# Patient Record
Sex: FEMALE | Race: WHITE | Employment: UNEMPLOYED | URBAN - METROPOLITAN AREA
[De-identification: names, ages, dates, MRNs, and addresses within clinical notes are randomized per-mention and may not be internally consistent; named-entity substitution may affect disease eponyms.]

---

## 2017-08-18 ENCOUNTER — LAB ENCOUNTER (OUTPATIENT)
Dept: LAB | Age: 52
End: 2017-08-18
Attending: FAMILY MEDICINE
Payer: COMMERCIAL

## 2017-08-18 ENCOUNTER — OFFICE VISIT (OUTPATIENT)
Dept: FAMILY MEDICINE CLINIC | Facility: CLINIC | Age: 52
End: 2017-08-18

## 2017-08-18 VITALS
OXYGEN SATURATION: 98 % | BODY MASS INDEX: 31.41 KG/M2 | HEART RATE: 86 BPM | SYSTOLIC BLOOD PRESSURE: 166 MMHG | WEIGHT: 184 LBS | HEIGHT: 64 IN | DIASTOLIC BLOOD PRESSURE: 106 MMHG | RESPIRATION RATE: 16 BRPM

## 2017-08-18 DIAGNOSIS — Z12.39 SCREENING FOR MALIGNANT NEOPLASM OF BREAST: ICD-10-CM

## 2017-08-18 DIAGNOSIS — I10 ESSENTIAL HYPERTENSION: ICD-10-CM

## 2017-08-18 DIAGNOSIS — E03.9 ACQUIRED HYPOTHYROIDISM: ICD-10-CM

## 2017-08-18 DIAGNOSIS — Z13.21 SCREENING FOR MALNUTRITION: ICD-10-CM

## 2017-08-18 DIAGNOSIS — Z23 NEED FOR VACCINATION: ICD-10-CM

## 2017-08-18 DIAGNOSIS — S40.811A ABRASION OF RIGHT UPPER EXTREMITY, INITIAL ENCOUNTER: ICD-10-CM

## 2017-08-18 DIAGNOSIS — G89.29 CHRONIC PAIN OF RIGHT KNEE: ICD-10-CM

## 2017-08-18 DIAGNOSIS — M25.561 CHRONIC PAIN OF RIGHT KNEE: ICD-10-CM

## 2017-08-18 DIAGNOSIS — I10 ESSENTIAL HYPERTENSION: Primary | ICD-10-CM

## 2017-08-18 LAB
25-HYDROXYVITAMIN D (TOTAL): 10.3 NG/ML (ref 30–100)
ALBUMIN SERPL-MCNC: 3.8 G/DL (ref 3.5–4.8)
ALP LIVER SERPL-CCNC: 126 U/L (ref 41–108)
ALT SERPL-CCNC: 40 U/L (ref 14–54)
ANTI-THYROGLOBULIN: <15 U/ML (ref ?–60)
ANTI-THYROPEROXIDASE: <28 U/ML (ref ?–60)
AST SERPL-CCNC: 79 U/L (ref 15–41)
BASOPHILS # BLD AUTO: 0.04 X10(3) UL (ref 0–0.1)
BASOPHILS NFR BLD AUTO: 0.6 %
BILIRUB SERPL-MCNC: 0.6 MG/DL (ref 0.1–2)
BUN BLD-MCNC: 14 MG/DL (ref 8–20)
CALCIUM BLD-MCNC: 8.8 MG/DL (ref 8.3–10.3)
CHLORIDE: 110 MMOL/L (ref 101–111)
CHOLEST SMN-MCNC: 226 MG/DL (ref ?–200)
CO2: 22 MMOL/L (ref 22–32)
CREAT BLD-MCNC: 0.68 MG/DL (ref 0.55–1.02)
EOSINOPHIL # BLD AUTO: 0.1 X10(3) UL (ref 0–0.3)
EOSINOPHIL NFR BLD AUTO: 1.6 %
ERYTHROCYTE [DISTWIDTH] IN BLOOD BY AUTOMATED COUNT: 15.5 % (ref 11.5–16)
FREE T4: 1 NG/DL (ref 0.9–1.8)
GLUCOSE BLD-MCNC: 93 MG/DL (ref 70–99)
HCT VFR BLD AUTO: 36.7 % (ref 34–50)
HDLC SERPL-MCNC: 58 MG/DL (ref 45–?)
HDLC SERPL: 3.9 {RATIO} (ref ?–4.44)
HGB BLD-MCNC: 11.2 G/DL (ref 12–16)
IMMATURE GRANULOCYTE COUNT: 0.02 X10(3) UL (ref 0–1)
IMMATURE GRANULOCYTE RATIO %: 0.3 %
LDLC SERPL CALC-MCNC: 142 MG/DL (ref ?–130)
LDLC SERPL-MCNC: 26 MG/DL (ref 5–40)
LYMPHOCYTES # BLD AUTO: 1.54 X10(3) UL (ref 0.9–4)
LYMPHOCYTES NFR BLD AUTO: 24.9 %
M PROTEIN MFR SERPL ELPH: 8 G/DL (ref 6.1–8.3)
MCH RBC QN AUTO: 28.4 PG (ref 27–33.2)
MCHC RBC AUTO-ENTMCNC: 30.5 G/DL (ref 31–37)
MCV RBC AUTO: 93.1 FL (ref 81–100)
MONOCYTES # BLD AUTO: 0.38 X10(3) UL (ref 0.1–0.6)
MONOCYTES NFR BLD AUTO: 6.1 %
NEUTROPHIL ABS PRELIM: 4.11 X10 (3) UL (ref 1.3–6.7)
NEUTROPHILS # BLD AUTO: 4.11 X10(3) UL (ref 1.3–6.7)
NEUTROPHILS NFR BLD AUTO: 66.5 %
NONHDLC SERPL-MCNC: 168 MG/DL (ref ?–130)
PLATELET # BLD AUTO: 110 10(3)UL (ref 150–450)
POTASSIUM SERPL-SCNC: 3.8 MMOL/L (ref 3.6–5.1)
RBC # BLD AUTO: 3.94 X10(6)UL (ref 3.8–5.1)
RED CELL DISTRIBUTION WIDTH-SD: 52.5 FL (ref 35.1–46.3)
SODIUM SERPL-SCNC: 142 MMOL/L (ref 136–144)
TRIGLYCERIDES: 131 MG/DL (ref ?–150)
TSI SER-ACNC: 3.7 MIU/ML (ref 0.35–5.5)
WBC # BLD AUTO: 6.2 X10(3) UL (ref 4–13)

## 2017-08-18 PROCEDURE — 86376 MICROSOMAL ANTIBODY EACH: CPT

## 2017-08-18 PROCEDURE — 80053 COMPREHEN METABOLIC PANEL: CPT

## 2017-08-18 PROCEDURE — 85025 COMPLETE CBC W/AUTO DIFF WBC: CPT

## 2017-08-18 PROCEDURE — 90715 TDAP VACCINE 7 YRS/> IM: CPT | Performed by: FAMILY MEDICINE

## 2017-08-18 PROCEDURE — 84439 ASSAY OF FREE THYROXINE: CPT

## 2017-08-18 PROCEDURE — 82306 VITAMIN D 25 HYDROXY: CPT

## 2017-08-18 PROCEDURE — 36415 COLL VENOUS BLD VENIPUNCTURE: CPT

## 2017-08-18 PROCEDURE — 80061 LIPID PANEL: CPT

## 2017-08-18 PROCEDURE — 90471 IMMUNIZATION ADMIN: CPT | Performed by: FAMILY MEDICINE

## 2017-08-18 PROCEDURE — 99204 OFFICE O/P NEW MOD 45 MIN: CPT | Performed by: FAMILY MEDICINE

## 2017-08-18 PROCEDURE — 84443 ASSAY THYROID STIM HORMONE: CPT

## 2017-08-18 PROCEDURE — 86800 THYROGLOBULIN ANTIBODY: CPT

## 2017-08-18 NOTE — PROGRESS NOTES
Escobar Nagy is a 46year old female. HPI:     HTN:    Uncontrolled. Severity is probably mild to moderate. Patient has not been on medication for months or years.   She tried a \"water pill\" in the past.  She did not care for the water pill because Malformation [OTHER] Brother    • Thyroid Disorder Sister      Hypothyroid     REVIEW OF SYSTEMS:   GENERAL HEALTH: overall feels well, no fever, no change in weight  SKIN: As in HPI  RESPIRATORY: Denies: SAGRARIO/HUBER/Cough/Wheeze/Orthopnea/PND  CARDIOVASCULAR: HORMONE; Future  - FREE T4 (FREE THYROXINE); Future  - lisinopril 10 MG Oral Tab; Take 1 tablet (10 mg total) by mouth daily. Dispense: 30 tablet; Refill: 1    2.  Acquired hypothyroidism  By history, and patient has not been on medications for months or y

## 2017-08-22 PROBLEM — E03.9 ACQUIRED HYPOTHYROIDISM: Status: ACTIVE | Noted: 2017-08-22

## 2017-08-22 PROBLEM — M25.561 CHRONIC PAIN OF RIGHT KNEE: Status: ACTIVE | Noted: 2017-08-22

## 2017-08-22 PROBLEM — G89.29 CHRONIC PAIN OF RIGHT KNEE: Status: ACTIVE | Noted: 2017-08-22

## 2017-08-22 RX ORDER — LISINOPRIL 10 MG/1
10 TABLET ORAL DAILY
Qty: 30 TABLET | Refills: 1 | Status: SHIPPED | OUTPATIENT
Start: 2017-08-22 | End: 2017-10-21

## 2017-08-24 ENCOUNTER — TELEPHONE (OUTPATIENT)
Dept: FAMILY MEDICINE CLINIC | Facility: CLINIC | Age: 52
End: 2017-08-24

## 2017-08-24 DIAGNOSIS — D64.9 ANEMIA, UNSPECIFIED TYPE: Primary | ICD-10-CM

## 2017-08-24 NOTE — TELEPHONE ENCOUNTER
----- Message from Gerda Ovalles DO sent at 8/24/2017  9:09 AM CDT -----  Please call pt and let her know we will discuss results at her upcoming appt. She is anemic, please order Fe studies to be done before her upcoming appt.

## 2017-08-25 ENCOUNTER — APPOINTMENT (OUTPATIENT)
Dept: LAB | Age: 52
End: 2017-08-25
Attending: FAMILY MEDICINE
Payer: COMMERCIAL

## 2017-08-25 DIAGNOSIS — D64.9 ANEMIA, UNSPECIFIED TYPE: ICD-10-CM

## 2017-08-25 LAB
DEPRECATED HBV CORE AB SER IA-ACNC: 30.9 NG/ML (ref 10–291)
IRON SATURATION: 5 % (ref 13–45)
IRON: 25 UG/DL (ref 28–170)
TOTAL IRON BINDING CAPACITY: 508 UG/DL (ref 298–536)
TRANSFERRIN: 341 MG/DL (ref 200–360)

## 2017-08-25 PROCEDURE — 83540 ASSAY OF IRON: CPT

## 2017-08-25 PROCEDURE — 36415 COLL VENOUS BLD VENIPUNCTURE: CPT

## 2017-08-25 PROCEDURE — 82728 ASSAY OF FERRITIN: CPT

## 2017-08-25 PROCEDURE — 83550 IRON BINDING TEST: CPT

## 2017-09-08 ENCOUNTER — OFFICE VISIT (OUTPATIENT)
Dept: FAMILY MEDICINE CLINIC | Facility: CLINIC | Age: 52
End: 2017-09-08

## 2017-09-08 VITALS
RESPIRATION RATE: 16 BRPM | BODY MASS INDEX: 31.18 KG/M2 | HEART RATE: 80 BPM | DIASTOLIC BLOOD PRESSURE: 78 MMHG | SYSTOLIC BLOOD PRESSURE: 110 MMHG | HEIGHT: 64 IN | WEIGHT: 182.63 LBS

## 2017-09-08 DIAGNOSIS — D50.9 IRON DEFICIENCY ANEMIA, UNSPECIFIED IRON DEFICIENCY ANEMIA TYPE: ICD-10-CM

## 2017-09-08 DIAGNOSIS — E78.00 HYPERCHOLESTEROLEMIA: ICD-10-CM

## 2017-09-08 DIAGNOSIS — Z12.11 SCREENING FOR MALIGNANT NEOPLASM OF COLON: ICD-10-CM

## 2017-09-08 DIAGNOSIS — R79.89 ELEVATED LIVER FUNCTION TESTS: ICD-10-CM

## 2017-09-08 DIAGNOSIS — E55.9 VITAMIN D DEFICIENCY: Primary | ICD-10-CM

## 2017-09-08 PROCEDURE — 99214 OFFICE O/P EST MOD 30 MIN: CPT | Performed by: FAMILY MEDICINE

## 2017-09-08 RX ORDER — ERGOCALCIFEROL 1.25 MG/1
50000 CAPSULE ORAL
Qty: 12 CAPSULE | Refills: 0 | Status: SHIPPED | OUTPATIENT
Start: 2017-09-08 | End: 2017-11-25

## 2017-09-08 NOTE — PROGRESS NOTES
Isistra Person is a 46year old female. HPI:     HTN:    Improved with start of Lisinopril. Severity is mild, pt is on one agent for HTN. Pt has been taking medications as instructed, no medication side effects.    At the store BP was 112-114/75 to l stool/black stool/bloating/constipation/diarrhea  NEURO: denies headaches/dizziness/fainting/weakness/change in vision  PSYCH: denies depression and anxiety      Immunization History  Administered            Date(s) Administered    TDAP                  08 Basophils %      %  0.6   Immature Granulocyte %      %  0.3   Glucose      70 - 99 mg/dL  93   BUN      8 - 20 mg/dL  14   CREATININE      0.55 - 1.02 mg/dL  0.68   GFR      >=60  101   CALCIUM      8.3 - 10.3 mg/dL  8.8   ALKALINE PHOSPHATASE      41 - Iron deficiency anemia, unspecified iron deficiency anemia type  Etiology uncertain, intake versus history of menstruation versus other. Patient encouraged to proceed with screening colonoscopy.   Patient to take over-the-counter iron with vitamin C twice

## 2017-09-08 NOTE — PATIENT INSTRUCTIONS
Take OTC Iron supplement and Vitamin C twice a day with a meal for 2 months. If constipation take MiraLax at bedtime.                     Understanding Your Cholesterol Numbers  The higher your blood cholesterol, the greater your risk for heart attac have fasted. This means you don’t eat for a certain amount of time before the test is done. Along with cholesterol, triglyceride can also lead to blocked arteries. Triglycerides are another type of fat.  Knowing your HDL, LDL, and triglyceride numbers, as w artery. These procedures include percutaneous coronary intervention, angioplasty, stent, and open-heart bypass surgery. · Adults who have diabetes.  Or adults who are at higher risk of having a heart attack or stroke and have an LDL cholesterol level of 70

## 2017-10-21 DIAGNOSIS — I10 ESSENTIAL HYPERTENSION: ICD-10-CM

## 2017-10-23 RX ORDER — LISINOPRIL 10 MG/1
TABLET ORAL
Qty: 90 TABLET | Refills: 0 | Status: SHIPPED | OUTPATIENT
Start: 2017-10-23 | End: 2018-01-05

## 2018-01-05 ENCOUNTER — LAB ENCOUNTER (OUTPATIENT)
Dept: LAB | Age: 53
End: 2018-01-05
Attending: FAMILY MEDICINE
Payer: COMMERCIAL

## 2018-01-05 ENCOUNTER — OFFICE VISIT (OUTPATIENT)
Dept: FAMILY MEDICINE CLINIC | Facility: CLINIC | Age: 53
End: 2018-01-05

## 2018-01-05 VITALS
HEIGHT: 63.78 IN | RESPIRATION RATE: 16 BRPM | DIASTOLIC BLOOD PRESSURE: 74 MMHG | BODY MASS INDEX: 30.25 KG/M2 | HEART RATE: 76 BPM | SYSTOLIC BLOOD PRESSURE: 122 MMHG | WEIGHT: 175 LBS

## 2018-01-05 DIAGNOSIS — Z00.00 LABORATORY EXAM ORDERED AS PART OF ROUTINE GENERAL MEDICAL EXAMINATION: ICD-10-CM

## 2018-01-05 DIAGNOSIS — R79.89 ELEVATED LIVER FUNCTION TESTS: ICD-10-CM

## 2018-01-05 DIAGNOSIS — D50.9 IRON DEFICIENCY ANEMIA, UNSPECIFIED IRON DEFICIENCY ANEMIA TYPE: ICD-10-CM

## 2018-01-05 DIAGNOSIS — Z00.00 ROUTINE GENERAL MEDICAL EXAMINATION AT A HEALTH CARE FACILITY: ICD-10-CM

## 2018-01-05 DIAGNOSIS — Z12.31 ENCOUNTER FOR SCREENING MAMMOGRAM FOR MALIGNANT NEOPLASM OF BREAST: ICD-10-CM

## 2018-01-05 DIAGNOSIS — Z01.419 ENCOUNTER FOR GYNECOLOGICAL EXAMINATION WITHOUT ABNORMAL FINDING: Primary | ICD-10-CM

## 2018-01-05 DIAGNOSIS — Z12.4 SCREENING FOR MALIGNANT NEOPLASM OF CERVIX: ICD-10-CM

## 2018-01-05 DIAGNOSIS — I10 ESSENTIAL HYPERTENSION: ICD-10-CM

## 2018-01-05 PROBLEM — E03.9 ACQUIRED HYPOTHYROIDISM: Status: RESOLVED | Noted: 2017-08-22 | Resolved: 2018-01-05

## 2018-01-05 LAB
25-HYDROXYVITAMIN D (TOTAL): 32.2 NG/ML (ref 30–100)
ALBUMIN SERPL-MCNC: 3.4 G/DL (ref 3.5–4.8)
ALP LIVER SERPL-CCNC: 102 U/L (ref 41–108)
ALT SERPL-CCNC: 32 U/L (ref 14–54)
AST SERPL-CCNC: 33 U/L (ref 15–41)
BILIRUB SERPL-MCNC: 0.7 MG/DL (ref 0.1–2)
BUN BLD-MCNC: 14 MG/DL (ref 8–20)
CALCIUM BLD-MCNC: 9.3 MG/DL (ref 8.3–10.3)
CHLORIDE: 105 MMOL/L (ref 101–111)
CHOLEST SMN-MCNC: 183 MG/DL (ref ?–200)
CO2: 27 MMOL/L (ref 22–32)
CREAT BLD-MCNC: 0.76 MG/DL (ref 0.55–1.02)
DEPRECATED HBV CORE AB SER IA-ACNC: 29.4 NG/ML (ref 10–291)
ERYTHROCYTE [DISTWIDTH] IN BLOOD BY AUTOMATED COUNT: 13.4 % (ref 11.5–16)
FREE T4: 1.1 NG/DL (ref 0.9–1.8)
GLUCOSE BLD-MCNC: 95 MG/DL (ref 70–99)
HCT VFR BLD AUTO: 42.1 % (ref 34–50)
HDLC SERPL-MCNC: 39 MG/DL (ref 45–?)
HDLC SERPL: 4.69 {RATIO} (ref ?–4.44)
HGB BLD-MCNC: 13 G/DL (ref 12–16)
IRON SATURATION: 16 % (ref 13–45)
IRON: 87 UG/DL (ref 28–170)
LDLC SERPL CALC-MCNC: 115 MG/DL (ref ?–130)
M PROTEIN MFR SERPL ELPH: 7.6 G/DL (ref 6.1–8.3)
MCH RBC QN AUTO: 28.7 PG (ref 27–33.2)
MCHC RBC AUTO-ENTMCNC: 30.9 G/DL (ref 31–37)
MCV RBC AUTO: 92.9 FL (ref 81–100)
NONHDLC SERPL-MCNC: 144 MG/DL (ref ?–130)
PLATELET # BLD AUTO: 126 10(3)UL (ref 150–450)
POTASSIUM SERPL-SCNC: 3.9 MMOL/L (ref 3.6–5.1)
RBC # BLD AUTO: 4.53 X10(6)UL (ref 3.8–5.1)
RED CELL DISTRIBUTION WIDTH-SD: 45.8 FL (ref 35.1–46.3)
SODIUM SERPL-SCNC: 139 MMOL/L (ref 136–144)
TOTAL IRON BINDING CAPACITY: 536 UG/DL (ref 298–536)
TRANSFERRIN: 360 MG/DL (ref 200–360)
TRIGL SERPL-MCNC: 146 MG/DL (ref ?–150)
TSI SER-ACNC: 4.44 MIU/ML (ref 0.35–5.5)
VLDLC SERPL CALC-MCNC: 29 MG/DL (ref 5–40)
WBC # BLD AUTO: 5.4 X10(3) UL (ref 4–13)

## 2018-01-05 PROCEDURE — 82728 ASSAY OF FERRITIN: CPT

## 2018-01-05 PROCEDURE — 99396 PREV VISIT EST AGE 40-64: CPT | Performed by: FAMILY MEDICINE

## 2018-01-05 PROCEDURE — 36415 COLL VENOUS BLD VENIPUNCTURE: CPT

## 2018-01-05 PROCEDURE — 82306 VITAMIN D 25 HYDROXY: CPT

## 2018-01-05 PROCEDURE — 85027 COMPLETE CBC AUTOMATED: CPT

## 2018-01-05 PROCEDURE — 80061 LIPID PANEL: CPT

## 2018-01-05 PROCEDURE — 84439 ASSAY OF FREE THYROXINE: CPT

## 2018-01-05 PROCEDURE — 88175 CYTOPATH C/V AUTO FLUID REDO: CPT | Performed by: FAMILY MEDICINE

## 2018-01-05 PROCEDURE — 83540 ASSAY OF IRON: CPT

## 2018-01-05 PROCEDURE — 83550 IRON BINDING TEST: CPT

## 2018-01-05 PROCEDURE — 80053 COMPREHEN METABOLIC PANEL: CPT

## 2018-01-05 PROCEDURE — 84443 ASSAY THYROID STIM HORMONE: CPT

## 2018-01-05 PROCEDURE — 87624 HPV HI-RISK TYP POOLED RSLT: CPT | Performed by: FAMILY MEDICINE

## 2018-01-05 PROCEDURE — 99213 OFFICE O/P EST LOW 20 MIN: CPT | Performed by: FAMILY MEDICINE

## 2018-01-05 RX ORDER — PNV NO.95/FERROUS FUM/FOLIC AC 28MG-0.8MG
1 TABLET ORAL DAILY
COMMUNITY
End: 2020-02-20

## 2018-01-05 RX ORDER — LISINOPRIL 10 MG/1
10 TABLET ORAL DAILY
Qty: 90 TABLET | Refills: 1 | Status: SHIPPED | OUTPATIENT
Start: 2018-01-05 | End: 2019-01-07

## 2018-01-05 RX ORDER — CHOLECALCIFEROL (VITAMIN D3) 1250 MCG
1 CAPSULE ORAL WEEKLY
COMMUNITY
End: 2019-06-15

## 2018-01-05 NOTE — PROGRESS NOTES
HPI:   Jamey Leroy is a 46year old female who presents for her annual wellness visit and Hypertension. Symptoms: denies discharge, itching, burning or dysuria, periods stopped age 52yo.    Last PAP: more than 5 years ago  Abnormal PAP: no  Sexually act Packs/day: 0.50      Years: 39.00        Types: Cigarettes     Start date: 1/18/1978  Smokeless tobacco: Never Used                      Alcohol use: Yes              Comment: 4 drinks a week after work    Occ: just lost her job last week.  Marital Status: HSM or tenderness  : External genitalia normal, introitus and vagina are normal, normal discharge and normal cervix. Bimanual exam normal, no adnexal masses or cervical motion tenderness. Specimen obtained for pap.   MUSCULOSKELETAL: gait normal, no part of routine general medical examination  - TSH+FREE T4; Future  - VITAMIN D, 25-HYDROXY; Future    5. Essential hypertension  Controlled. Continue lisinopril. Pt counseled on healthy diet and regular exercise. Recommend low sodium diet.     - lisinop

## 2018-01-08 LAB — HPV I/H RISK 1 DNA SPEC QL NAA+PROBE: NEGATIVE

## 2018-01-11 ENCOUNTER — TELEPHONE (OUTPATIENT)
Dept: FAMILY MEDICINE CLINIC | Facility: CLINIC | Age: 53
End: 2018-01-11

## 2018-01-11 NOTE — TELEPHONE ENCOUNTER
----- Message from Kannan Farrell DO sent at 1/10/2018  2:58 PM CST -----  Please call patient: Pap and high-risk HPV negative/normal.  However, shift of bacteria consistent with possible bacterial vaginosis, is patient having any symptoms consistent wit

## 2018-01-19 ENCOUNTER — TELEPHONE (OUTPATIENT)
Dept: FAMILY MEDICINE CLINIC | Facility: CLINIC | Age: 53
End: 2018-01-19

## 2018-01-19 NOTE — TELEPHONE ENCOUNTER
----- Message from Chaz Young DO sent at 1/18/2018  7:45 PM CST -----  Please call pt: Vit D now in normal range. Recommend take otc Vit D 2000 units once a day with the biggest meal of the day. Iron improved. She can stop taking the iron supplement.

## 2018-01-19 NOTE — TELEPHONE ENCOUNTER
The patient was informed of her test results and Dr. Kvng Marshall recommendations. The patient verbalized understanding and has no further questions at this time.

## 2018-08-14 ENCOUNTER — TELEPHONE (OUTPATIENT)
Dept: FAMILY MEDICINE CLINIC | Facility: CLINIC | Age: 53
End: 2018-08-14

## 2018-08-14 NOTE — TELEPHONE ENCOUNTER
Annette Santana from [facility] [address] is calling to give an update regarding Carol Viramontes.   She states that the patient completed 19 sessions of an outpatient substance abuse program however she did continue to drink while participating in the program.  They in turn

## 2019-01-07 DIAGNOSIS — I10 ESSENTIAL HYPERTENSION: ICD-10-CM

## 2019-01-07 RX ORDER — LISINOPRIL 10 MG/1
TABLET ORAL
Qty: 30 TABLET | Refills: 0 | Status: SHIPPED | OUTPATIENT
Start: 2019-01-07 | End: 2019-06-15

## 2019-01-07 NOTE — TELEPHONE ENCOUNTER
Lisinopril approved qty 30 NR  Patient needs appt for any further refills  Please call to schedule appt  [phone] (home)

## 2019-02-05 DIAGNOSIS — I10 ESSENTIAL HYPERTENSION: ICD-10-CM

## 2019-02-05 RX ORDER — LISINOPRIL 10 MG/1
TABLET ORAL
Qty: 30 TABLET | Refills: 0 | OUTPATIENT
Start: 2019-02-05

## 2019-02-05 NOTE — TELEPHONE ENCOUNTER
No future appointments.   Unable to refill meds  Patient needs appt  A message was left for patient in January to schedule an appt for further refills

## 2019-04-16 ENCOUNTER — PATIENT OUTREACH (OUTPATIENT)
Dept: FAMILY MEDICINE CLINIC | Facility: CLINIC | Age: 54
End: 2019-04-16

## 2019-06-15 ENCOUNTER — OFFICE VISIT (OUTPATIENT)
Dept: FAMILY MEDICINE CLINIC | Facility: CLINIC | Age: 54
End: 2019-06-15
Payer: COMMERCIAL

## 2019-06-15 VITALS
DIASTOLIC BLOOD PRESSURE: 82 MMHG | SYSTOLIC BLOOD PRESSURE: 128 MMHG | HEIGHT: 64 IN | WEIGHT: 225 LBS | BODY MASS INDEX: 38.41 KG/M2 | HEART RATE: 72 BPM

## 2019-06-15 DIAGNOSIS — E55.9 VITAMIN D DEFICIENCY: ICD-10-CM

## 2019-06-15 DIAGNOSIS — D69.6 THROMBOCYTOPENIA (HCC): ICD-10-CM

## 2019-06-15 DIAGNOSIS — Z12.11 SCREENING FOR MALIGNANT NEOPLASM OF COLON: ICD-10-CM

## 2019-06-15 DIAGNOSIS — F17.210 CIGARETTE SMOKER: ICD-10-CM

## 2019-06-15 DIAGNOSIS — Z87.81 HISTORY OF FRACTURE OF RIGHT HIP: ICD-10-CM

## 2019-06-15 DIAGNOSIS — E78.00 HYPERCHOLESTEROLEMIA: ICD-10-CM

## 2019-06-15 DIAGNOSIS — Z12.31 ENCOUNTER FOR SCREENING MAMMOGRAM FOR MALIGNANT NEOPLASM OF BREAST: ICD-10-CM

## 2019-06-15 DIAGNOSIS — Z78.0 POSTMENOPAUSAL: ICD-10-CM

## 2019-06-15 DIAGNOSIS — D50.9 IRON DEFICIENCY ANEMIA, UNSPECIFIED IRON DEFICIENCY ANEMIA TYPE: ICD-10-CM

## 2019-06-15 DIAGNOSIS — E66.01 CLASS 2 SEVERE OBESITY DUE TO EXCESS CALORIES WITH SERIOUS COMORBIDITY AND BODY MASS INDEX (BMI) OF 38.0 TO 38.9 IN ADULT (HCC): ICD-10-CM

## 2019-06-15 DIAGNOSIS — I10 ESSENTIAL HYPERTENSION: Primary | ICD-10-CM

## 2019-06-15 DIAGNOSIS — M85.851 OSTEOPENIA OF RIGHT HIP: ICD-10-CM

## 2019-06-15 PROCEDURE — 99214 OFFICE O/P EST MOD 30 MIN: CPT | Performed by: FAMILY MEDICINE

## 2019-06-15 RX ORDER — ESCITALOPRAM OXALATE 5 MG/1
5 TABLET ORAL DAILY
COMMUNITY
End: 2021-09-17 | Stop reason: DRUGHIGH

## 2019-06-15 RX ORDER — ESCITALOPRAM OXALATE 10 MG/1
10 TABLET ORAL DAILY
COMMUNITY
Start: 2019-06-06 | End: 2021-09-17 | Stop reason: DRUGHIGH

## 2019-06-15 RX ORDER — HYDROCODONE BITARTRATE AND ACETAMINOPHEN 10; 325 MG/1; MG/1
TABLET ORAL AS NEEDED
Refills: 0 | COMMUNITY
Start: 2019-05-31 | End: 2020-02-20 | Stop reason: ALTCHOICE

## 2019-06-15 RX ORDER — ASPIRIN 325 MG
325 TABLET ORAL DAILY
COMMUNITY
End: 2019-07-24

## 2019-06-15 RX ORDER — LAMOTRIGINE 25 MG/1
50 TABLET ORAL 2 TIMES DAILY
COMMUNITY
Start: 2019-06-06 | End: 2020-07-13 | Stop reason: ALTCHOICE

## 2019-06-15 RX ORDER — THIAMINE MONONITRATE (VIT B1) 100 MG
100 TABLET ORAL DAILY
Refills: 1 | COMMUNITY
Start: 2019-05-17

## 2019-06-15 RX ORDER — OMEPRAZOLE 20 MG/1
20 CAPSULE, DELAYED RELEASE ORAL
COMMUNITY
End: 2020-07-13 | Stop reason: ALTCHOICE

## 2019-06-15 RX ORDER — LISINOPRIL 10 MG/1
10 TABLET ORAL DAILY
Qty: 90 TABLET | Refills: 1 | Status: SHIPPED | OUTPATIENT
Start: 2019-06-15 | End: 2019-11-28

## 2019-06-15 NOTE — PROGRESS NOTES
Mynor Pearce is a 47year old female. HPI:     HTN:    Stable. Severity is mild, patient controlled in the past on lisinopril. .  Off lisinopril for 4-5 months. Home BP monitoring: has not checked lately  Diet: starting to follow low carb diet. hypertension 8/18/2017   • Gallstone pancreatitis 2015    [facility]   • Hip fracture, right ([address] 05/2019   • Iron deficiency anemia 9/8/2017      Past Surgical History:   Procedure Laterality Date   • HIP SURGERY  05/15/2019    Right hip facture with MARQUES and Oriented x3, CN II-XII grossly intact, no focal weakness  PSYCH: affect normal, normal thought content.         DATA:    Component      Latest Ref Rng & Units 1/5/2018 8/25/2017   Glucose      70 - 99 mg/dL 95    BUN      8 - 20 mg/dL 14    CREATININE body mass index (bmi) of 38.0 to 38.9 in adult (hcc)  Hypercholesterolemia  Iron deficiency anemia, unspecified iron deficiency anemia type  History of fracture of right hip  Osteopenia of right hip  Vitamin d deficiency  Cigarette smoker  Postmenopausal regular weightbearing exercises to help prevent osteoporosis. - XR DEXA BONE DENSITOMETRY (CPT=77080); Future  - VITAMIN D, 25-HYDROXY    8. Vitamin D deficiency  Recheck vitamin D level. - VITAMIN D, 25-HYDROXY    9.  Cigarette smoker  Patient advised to

## 2019-06-28 PROBLEM — F17.210 CIGARETTE SMOKER: Status: ACTIVE | Noted: 2019-06-28

## 2019-06-28 PROBLEM — M85.851 OSTEOPENIA OF RIGHT HIP: Status: ACTIVE | Noted: 2019-06-28

## 2019-06-28 PROBLEM — D69.6 THROMBOCYTOPENIA (HCC): Status: ACTIVE | Noted: 2019-06-28

## 2019-06-28 PROBLEM — D69.6 THROMBOCYTOPENIA: Status: ACTIVE | Noted: 2019-06-28

## 2019-06-28 PROBLEM — Z87.81 HISTORY OF FRACTURE OF RIGHT HIP: Status: ACTIVE | Noted: 2019-06-28

## 2019-07-03 ENCOUNTER — TELEPHONE (OUTPATIENT)
Dept: FAMILY MEDICINE CLINIC | Facility: CLINIC | Age: 54
End: 2019-07-03

## 2019-07-03 DIAGNOSIS — Z12.11 ENCOUNTER FOR SCREENING COLONOSCOPY: Primary | ICD-10-CM

## 2019-07-03 NOTE — TELEPHONE ENCOUNTER
Informed patient of Dr. Ning Packer instructions, she verbalized understanding and she will call us to schedule appointment for lab results. She requested referral (Dr. Senait Reddy) to be mailed to her.

## 2019-07-23 ENCOUNTER — OFFICE VISIT (OUTPATIENT)
Dept: SURGERY | Facility: CLINIC | Age: 54
End: 2019-07-23

## 2019-07-23 VITALS
WEIGHT: 214 LBS | DIASTOLIC BLOOD PRESSURE: 72 MMHG | HEIGHT: 64 IN | BODY MASS INDEX: 36.54 KG/M2 | SYSTOLIC BLOOD PRESSURE: 128 MMHG | TEMPERATURE: 98 F

## 2019-07-23 DIAGNOSIS — Z87.81 HISTORY OF FRACTURE OF RIGHT HIP: ICD-10-CM

## 2019-07-23 DIAGNOSIS — F17.210 CIGARETTE SMOKER: ICD-10-CM

## 2019-07-23 DIAGNOSIS — K85.10 GALLSTONE PANCREATITIS: ICD-10-CM

## 2019-07-23 DIAGNOSIS — Z12.11 ENCOUNTER FOR SCREENING COLONOSCOPY: Primary | ICD-10-CM

## 2019-07-23 PROCEDURE — 99203 OFFICE O/P NEW LOW 30 MIN: CPT | Performed by: COLON & RECTAL SURGERY

## 2019-07-23 RX ORDER — POLYETHYLENE GLYCOL 3350, SODIUM CHLORIDE, SODIUM BICARBONATE, POTASSIUM CHLORIDE 420; 11.2; 5.72; 1.48 G/4L; G/4L; G/4L; G/4L
POWDER, FOR SOLUTION ORAL
Qty: 1 BOTTLE | Refills: 0 | Status: SHIPPED | OUTPATIENT
Start: 2019-07-23 | End: 2020-07-13 | Stop reason: ALTCHOICE

## 2019-07-23 NOTE — H&P
New Patient Visit Note       Active Problems      1. Encounter for screening colonoscopy    2. Cigarette smoker    3. History of fracture of right hip    4.  Gallstone pancreatitis        Chief Complaint   Gallstone pancreatitis, encounter for screening col patient has not had a previous colonoscopy. The patient does not have history of prior polyps. The patient does not have a history of prior colon cancer. The patient does not have a history of black/tarry stools.   The patient does not have a was the worst pain of her entire life. She did recover her episode of pancreatitis but has not undergone laparoscopic cholecystectomy. The patient denies any significant alcohol use. She does smoke approximately half a pack per day.     I acted as a sc file      Highest education level: Not on file    Tobacco Use      Smoking status: Current Every Day Smoker        Packs/day: 0.50        Years: 39.00        Pack years: 19.5        Types: Cigarettes        Start date: 1/18/1978      Smokeless tobacco: Nev palpitations and leg swelling. Gastrointestinal: Positive for anal bleeding. Negative for abdominal distention, abdominal pain, blood in stool, constipation, diarrhea, nausea and vomiting.    Genitourinary: Negative for difficulty urinating, dysuria, freq negative in the left inguinal area. Lymphadenopathy:     She has no cervical adenopathy. Right cervical: No superficial cervical and no deep cervical adenopathy present.        Left cervical: No superficial cervical and no deep cervical adenopathy current symptoms attributable to biliary colic, but is at extreme risk for gallstone pancreatitis. She has no current abdominal pain that would be consistent with biliary colic. The patient denies any significant alcohol use.   She does smoke approximat

## 2019-07-24 ENCOUNTER — OFFICE VISIT (OUTPATIENT)
Dept: FAMILY MEDICINE CLINIC | Facility: CLINIC | Age: 54
End: 2019-07-24
Payer: COMMERCIAL

## 2019-07-24 VITALS
SYSTOLIC BLOOD PRESSURE: 126 MMHG | WEIGHT: 217 LBS | HEIGHT: 64 IN | OXYGEN SATURATION: 98 % | BODY MASS INDEX: 37.05 KG/M2 | HEART RATE: 70 BPM | DIASTOLIC BLOOD PRESSURE: 76 MMHG

## 2019-07-24 DIAGNOSIS — D50.9 IRON DEFICIENCY ANEMIA, UNSPECIFIED IRON DEFICIENCY ANEMIA TYPE: Primary | ICD-10-CM

## 2019-07-24 DIAGNOSIS — E78.2 MIXED HYPERLIPIDEMIA: ICD-10-CM

## 2019-07-24 DIAGNOSIS — H57.02 ANISOCORIA: ICD-10-CM

## 2019-07-24 DIAGNOSIS — I10 ESSENTIAL HYPERTENSION: ICD-10-CM

## 2019-07-24 PROBLEM — Z12.11 ENCOUNTER FOR SCREENING COLONOSCOPY: Status: ACTIVE | Noted: 2019-07-24

## 2019-07-24 PROBLEM — K85.10 GALLSTONE PANCREATITIS (HCC): Status: ACTIVE | Noted: 2019-07-24

## 2019-07-24 PROBLEM — K85.10 GALLSTONE PANCREATITIS: Status: ACTIVE | Noted: 2019-07-24

## 2019-07-24 PROCEDURE — 99214 OFFICE O/P EST MOD 30 MIN: CPT | Performed by: FAMILY MEDICINE

## 2019-07-24 RX ORDER — BIOTIN 1 MG
1 TABLET ORAL DAILY
COMMUNITY

## 2019-07-24 NOTE — PATIENT INSTRUCTIONS
Please schedule your mammogram and bone density tests. Kicking the Smoking Habit  If you smoke, quitting is one of the best changes you can make for your heart and your overall health.  Your risk of heart attack goes down within one day of before without success, this time avoid the triggers that may cause the relapse. · Make the most of slip-ups. Try to learn from them, and then get back on track. · Be accountable to your friends and your calendar so that you stay on track.   For family an

## 2019-07-24 NOTE — PROGRESS NOTES
Gary Hopkins is a 47year old female. HPI:     HTN:    Stable. Severity is mild, patient is on one agent, lisinopril, to control her hypertension. Pt has been taking medications as instructed, no medication side effects.        Hyperlipidemia:  Die deficiency anemia 9/8/2017      Past Surgical History:   Procedure Laterality Date   • HIP SURGERY  05/15/2019    Right hip facture with ORIF, Dr. Haleigh Grimes       Social History:    Social History    Tobacco Use      Smoking status: Current Every Day Smoker 4.16     Hemoglobin      11.7 - 15.5 g/dL 11.5 (L) 13.0    Hematocrit      35.0 - 45.0 % 36.3 42.1    MCV      80.0 - 100.0 fL 87.3 92.9    MCH      27.0 - 33.0 pg 27.6 28.7    MCHC      32.0 - 36.0 g/dL 31.7 (L) 30.9 (L)    RDW      11.0 - 15.0 % 13.0 13. Total      22.0 - 32.0 mmol/L  27.0    WBC      4.0 - 13.0 x10(3) uL  5.4    RBC      3.80 - 5.10 x10(6)uL  4.53    RDW-SD      35.1 - 46.3 fL  45.8    Cholesterol, Total      <200 mg/dL 169 183    Triglycerides      <150 mg/dL 177 (H) 146    HDL Cholester otherwise. Orders Placed This Encounter      FERRITIN [457] [Q]      IRON AND TIBC [4973] [Q]      Return in about 3 months (around 10/24/2019) for Annual Wellness Visit and as needed or indicated.

## 2019-07-24 NOTE — PATIENT INSTRUCTIONS
This patient presents at the referral of her primary care physician, Dr. Dori Rivera, for consultations regarding gallstone pancreatitis, and colonoscopy. The patient has never undergone colonoscopy.       The patient denies any abdominal pain or diste procedure were discussed at today's office visit. Additionally, I discussed with the patient that due to her history of gallstone pancreatitis I have a strong recommendation for laparoscopic cholecystectomy.   This is to prevent any further episodes of p

## 2019-09-23 ENCOUNTER — TELEPHONE (OUTPATIENT)
Dept: FAMILY MEDICINE CLINIC | Facility: CLINIC | Age: 54
End: 2019-09-23

## 2019-09-23 NOTE — TELEPHONE ENCOUNTER
Pt said her brother is moving in with her and he has cats and she is allergic. She wants to know what OTC allergy meds are good?

## 2019-10-24 ENCOUNTER — PATIENT OUTREACH (OUTPATIENT)
Dept: SURGERY | Facility: CLINIC | Age: 54
End: 2019-10-24

## 2019-11-28 DIAGNOSIS — I10 ESSENTIAL HYPERTENSION: ICD-10-CM

## 2019-11-29 RX ORDER — LISINOPRIL 10 MG/1
TABLET ORAL
Qty: 90 TABLET | Refills: 0 | Status: SHIPPED | OUTPATIENT
Start: 2019-11-29 | End: 2020-02-20

## 2020-02-04 LAB
% SATURATION: 20 % (CALC) (ref 16–45)
FERRITIN: 54 NG/ML (ref 16–232)
IRON BINDING CAPACITY: 411 MCG/DL (CALC) (ref 250–450)
IRON, TOTAL: 81 MCG/DL (ref 45–160)

## 2020-02-11 ENCOUNTER — HOSPITAL ENCOUNTER (OUTPATIENT)
Dept: BONE DENSITY | Age: 55
Discharge: HOME | End: 2020-02-11
Attending: FAMILY MEDICINE
Payer: COMMERCIAL

## 2020-02-11 ENCOUNTER — HOSPITAL ENCOUNTER (OUTPATIENT)
Dept: MAMMOGRAPHY | Age: 55
Discharge: HOME | End: 2020-02-11
Attending: FAMILY MEDICINE
Payer: COMMERCIAL

## 2020-02-11 DIAGNOSIS — M85.851 OSTEOPENIA OF RIGHT HIP: ICD-10-CM

## 2020-02-11 DIAGNOSIS — Z12.31 ENCOUNTER FOR SCREENING MAMMOGRAM FOR MALIGNANT NEOPLASM OF BREAST: ICD-10-CM

## 2020-02-11 DIAGNOSIS — Z78.0 POSTMENOPAUSAL: ICD-10-CM

## 2020-02-11 PROCEDURE — 77080 DXA BONE DENSITY AXIAL: CPT | Performed by: FAMILY MEDICINE

## 2020-02-11 PROCEDURE — 77067 SCR MAMMO BI INCL CAD: CPT | Performed by: FAMILY MEDICINE

## 2020-02-11 PROCEDURE — 77063 BREAST TOMOSYNTHESIS BI: CPT | Performed by: FAMILY MEDICINE

## 2020-02-20 ENCOUNTER — OFFICE VISIT (OUTPATIENT)
Dept: FAMILY MEDICINE CLINIC | Facility: CLINIC | Age: 55
End: 2020-02-20
Payer: COMMERCIAL

## 2020-02-20 VITALS
WEIGHT: 237 LBS | TEMPERATURE: 98 F | OXYGEN SATURATION: 98 % | HEIGHT: 64 IN | BODY MASS INDEX: 40.46 KG/M2 | HEART RATE: 72 BPM | DIASTOLIC BLOOD PRESSURE: 90 MMHG | SYSTOLIC BLOOD PRESSURE: 140 MMHG

## 2020-02-20 DIAGNOSIS — H93.13 TINNITUS OF BOTH EARS: ICD-10-CM

## 2020-02-20 DIAGNOSIS — R63.5 WEIGHT GAIN: ICD-10-CM

## 2020-02-20 DIAGNOSIS — R21 RASH AND NONSPECIFIC SKIN ERUPTION: ICD-10-CM

## 2020-02-20 DIAGNOSIS — H91.93 HEARING DECREASED, BILATERAL: ICD-10-CM

## 2020-02-20 DIAGNOSIS — E66.01 MORBID OBESITY WITH BMI OF 40.0-44.9, ADULT (HCC): ICD-10-CM

## 2020-02-20 DIAGNOSIS — I10 ESSENTIAL HYPERTENSION: Primary | ICD-10-CM

## 2020-02-20 PROCEDURE — 99214 OFFICE O/P EST MOD 30 MIN: CPT | Performed by: FAMILY MEDICINE

## 2020-02-20 RX ORDER — LAMOTRIGINE 100 MG/1
150 TABLET ORAL 2 TIMES DAILY
COMMUNITY
Start: 2020-02-16 | End: 2020-07-13 | Stop reason: ALTCHOICE

## 2020-02-20 RX ORDER — LISINOPRIL 10 MG/1
10 TABLET ORAL NIGHTLY
Qty: 90 TABLET | Refills: 0 | Status: SHIPPED | OUTPATIENT
Start: 2020-02-20 | End: 2020-06-01

## 2020-02-20 RX ORDER — CELECOXIB 200 MG/1
200 CAPSULE ORAL 2 TIMES DAILY
COMMUNITY
End: 2020-07-13

## 2020-02-20 NOTE — PROGRESS NOTES
Sonal Correa is a 47year old female. HPI:     HTN:    Uncontrolled. Severity is mild, pt is on one agent for her HTN. Pt has been taking medications as instructed, no medication side effects.    Diet: no particular diet being followed ([facility] 05/2019   • Iron deficiency anemia 9/8/2017      Past Surgical History:   Procedure Laterality Date   • BONE DENSITY TEST SCAN Bilateral 02/11/2020   • COLONOSCOPY, POSSIBLE BIOPSY, POSSIBLE POLYPECTOMY 39569 N/A 8/22/2019    Performed by Nikki Whittaker mm/S3/S4  VASCULAR:  No LE edema  GI: normal bowel sounds, NT/ND, no pulsations, no r/r/g, no masses, no HSM  EXTREMITIES: no cyanosis or clubbing  NEURO: Alert and Oriented x3, CN II-XII grossly intact, no focal weakness  PSYCH: affect normal, normal thou ng/dL 1.0 1.1   TSH      mIU/L 2.28 4.440         ASSESSMENT AND PLAN:       Essential hypertension  (primary encounter diagnosis)  Rash and nonspecific skin eruption  Morbid obesity with bmi of 40.0-44.9, adult (hcc)  Weight gain  Hearing decreased, bilat

## 2020-02-21 DIAGNOSIS — I10 ESSENTIAL HYPERTENSION: ICD-10-CM

## 2020-02-21 RX ORDER — LISINOPRIL 10 MG/1
TABLET ORAL
Qty: 90 TABLET | Refills: 0 | OUTPATIENT
Start: 2020-02-21

## 2020-02-25 PROBLEM — R63.5 WEIGHT GAIN: Status: ACTIVE | Noted: 2020-02-25

## 2020-02-25 PROBLEM — H93.13 TINNITUS OF BOTH EARS: Status: ACTIVE | Noted: 2020-02-25

## 2020-02-25 PROBLEM — H91.93 HEARING DECREASED, BILATERAL: Status: ACTIVE | Noted: 2020-02-25

## 2020-02-25 PROBLEM — E66.01 MORBID OBESITY WITH BMI OF 40.0-44.9, ADULT (HCC): Status: ACTIVE | Noted: 2020-02-25

## 2020-02-27 ENCOUNTER — HOSPITAL ENCOUNTER (OUTPATIENT)
Dept: MAMMOGRAPHY | Age: 55
Discharge: HOME | End: 2020-02-27
Attending: FAMILY MEDICINE
Payer: COMMERCIAL

## 2020-02-27 ENCOUNTER — HOSPITAL ENCOUNTER (OUTPATIENT)
Dept: ULTRASOUND IMAGING | Age: 55
Discharge: HOME | End: 2020-02-27
Attending: FAMILY MEDICINE
Payer: COMMERCIAL

## 2020-02-27 DIAGNOSIS — R92.2 INCONCLUSIVE MAMMOGRAM: ICD-10-CM

## 2020-02-27 PROCEDURE — 77061 BREAST TOMOSYNTHESIS UNI: CPT | Performed by: FAMILY MEDICINE

## 2020-02-27 PROCEDURE — 76642 ULTRASOUND BREAST LIMITED: CPT | Performed by: FAMILY MEDICINE

## 2020-02-27 PROCEDURE — 77065 DX MAMMO INCL CAD UNI: CPT | Performed by: FAMILY MEDICINE

## 2020-06-01 DIAGNOSIS — I10 ESSENTIAL HYPERTENSION: ICD-10-CM

## 2020-06-01 RX ORDER — LISINOPRIL 10 MG/1
TABLET ORAL
Qty: 90 TABLET | Refills: 0 | Status: SHIPPED | OUTPATIENT
Start: 2020-06-01 | End: 2020-07-13 | Stop reason: DRUGHIGH

## 2020-07-13 ENCOUNTER — OFFICE VISIT (OUTPATIENT)
Dept: FAMILY MEDICINE CLINIC | Facility: CLINIC | Age: 55
End: 2020-07-13
Payer: COMMERCIAL

## 2020-07-13 VITALS
SYSTOLIC BLOOD PRESSURE: 140 MMHG | TEMPERATURE: 98 F | WEIGHT: 245 LBS | DIASTOLIC BLOOD PRESSURE: 88 MMHG | BODY MASS INDEX: 41.83 KG/M2 | HEIGHT: 64 IN | OXYGEN SATURATION: 97 % | HEART RATE: 73 BPM

## 2020-07-13 DIAGNOSIS — Z79.899 ENCOUNTER FOR LONG-TERM CURRENT USE OF MEDICATION: ICD-10-CM

## 2020-07-13 DIAGNOSIS — I10 ESSENTIAL HYPERTENSION: ICD-10-CM

## 2020-07-13 DIAGNOSIS — Z00.00 ROUTINE GENERAL MEDICAL EXAMINATION AT A HEALTH CARE FACILITY: Primary | ICD-10-CM

## 2020-07-13 DIAGNOSIS — E78.2 MIXED HYPERLIPIDEMIA: ICD-10-CM

## 2020-07-13 DIAGNOSIS — R63.5 WEIGHT GAIN: ICD-10-CM

## 2020-07-13 DIAGNOSIS — E66.01 MORBID OBESITY WITH BMI OF 40.0-44.9, ADULT (HCC): ICD-10-CM

## 2020-07-13 DIAGNOSIS — M25.551 LATERAL PAIN OF RIGHT HIP: ICD-10-CM

## 2020-07-13 DIAGNOSIS — D69.6 THROMBOCYTOPENIA (HCC): ICD-10-CM

## 2020-07-13 DIAGNOSIS — F17.210 CIGARETTE SMOKER: ICD-10-CM

## 2020-07-13 DIAGNOSIS — R21 RASH AND NONSPECIFIC SKIN ERUPTION: ICD-10-CM

## 2020-07-13 DIAGNOSIS — Z87.81 HISTORY OF FRACTURE OF RIGHT HIP: ICD-10-CM

## 2020-07-13 PROBLEM — E78.00 HYPERCHOLESTEROLEMIA: Status: RESOLVED | Noted: 2017-09-08 | Resolved: 2020-07-13

## 2020-07-13 PROBLEM — D50.9 IRON DEFICIENCY ANEMIA: Status: RESOLVED | Noted: 2017-09-08 | Resolved: 2020-07-13

## 2020-07-13 PROBLEM — K85.10 GALLSTONE PANCREATITIS: Status: RESOLVED | Noted: 2019-07-24 | Resolved: 2020-07-13

## 2020-07-13 PROBLEM — K85.10 GALLSTONE PANCREATITIS (HCC): Status: RESOLVED | Noted: 2019-07-24 | Resolved: 2020-07-13

## 2020-07-13 PROBLEM — Z01.419 ENCOUNTER FOR GYNECOLOGICAL EXAMINATION WITHOUT ABNORMAL FINDING: Status: RESOLVED | Noted: 2018-01-05 | Resolved: 2020-07-13

## 2020-07-13 PROCEDURE — 99396 PREV VISIT EST AGE 40-64: CPT | Performed by: FAMILY MEDICINE

## 2020-07-13 PROCEDURE — 99214 OFFICE O/P EST MOD 30 MIN: CPT | Performed by: FAMILY MEDICINE

## 2020-07-13 RX ORDER — LISINOPRIL 20 MG/1
20 TABLET ORAL NIGHTLY
Qty: 90 TABLET | Refills: 1 | Status: SHIPPED | OUTPATIENT
Start: 2020-07-13 | End: 2020-10-19 | Stop reason: ALTCHOICE

## 2020-07-13 RX ORDER — ARIPIPRAZOLE 2 MG/1
2 TABLET ORAL DAILY
COMMUNITY
Start: 2020-07-02 | End: 2020-11-24 | Stop reason: ALTCHOICE

## 2020-07-13 RX ORDER — CELECOXIB 200 MG/1
200 CAPSULE ORAL 2 TIMES DAILY
Refills: 0 | Status: CANCELLED | OUTPATIENT
Start: 2020-07-13

## 2020-07-13 RX ORDER — MULTIVITAMIN WITH IRON
250 TABLET ORAL DAILY
COMMUNITY

## 2020-07-13 RX ORDER — MELOXICAM 7.5 MG/1
7.5 TABLET ORAL DAILY
Qty: 30 TABLET | Refills: 1 | Status: SHIPPED | OUTPATIENT
Start: 2020-07-13 | End: 2020-12-26

## 2020-07-13 RX ORDER — METHYLDOPA 250 MG
1000 TABLET ORAL DAILY
COMMUNITY

## 2020-07-13 RX ORDER — FLUOCINONIDE 0.5 MG/G
OINTMENT TOPICAL
Qty: 30 G | Refills: 0 | Status: SHIPPED | OUTPATIENT
Start: 2020-07-13 | End: 2021-09-17

## 2020-07-13 NOTE — PROGRESS NOTES
HPI:   Trina Lake is a 54year old female who presents for her annual wellness visit and Hypertension. Symptoms: denies discharge, itching, burning or dysuria, periods stopped age 52yo.    Last PAP: Up to date  FDLMP: age 49 yo      Hypertension:  Unco mg by mouth daily. • Meloxicam 7.5 MG Oral Tab Take 1 tablet (7.5 mg total) by mouth daily. 30 tablet 1   • lisinopril 20 MG Oral Tab Take 1 tablet (20 mg total) by mouth nightly.  90 tablet 1   • Fluocinonide 0.05 % External Ointment Apply thin layer a work.  Diet: portion control     REVIEW OF SYSTEMS:   GENERAL: denies fevers, weakness, trouble sleeping or weight changes  SKIN: As in HPI  EYES:denies vision changes  HEENT: denies upper respiratory symptoms  LUNGS: denies cough or shortness of breath wi affect        Immunization History  Administered            Date(s) Administered    TDAP                  08/18/2017      DATA:    Component      Latest Ref Rng & Units 6/15/2020 2/3/2020 6/19/2019   WHITE BLOOD CELL COUNT      3.8 - 10.8 Thousand/uL 5.4 U/L 141  125   AST      10 - 35 U/L 46 (H)  25   ALT (SGPT)      6 - 29 U/L 28  16   Cholesterol, Total      <200 mg/dL 169  169   HDL Cholesterol      > OR = 50 mg/dL 48 (L)  36 (L)   Triglycerides      <150 mg/dL 154 (H)  177 (H)   LDL Cholesterol Calc advised. Patient counseled on healthy diet and regular exercise. Patient declines referral to dietitian. 5. Thrombocytopenia ([facility] [address]  Platelets have been low in the past, fairly stable. We will continue to monitor. Patient confirms easy bruising.     6 tablet (20 mg total) by mouth nightly. • Fluocinonide 0.05 % External Ointment 30 g 0     Sig: Apply thin layer and gently massage in twice a day.        Imaging & Consults:  CT LUNG LD SCREENING(CPT=)    Return in about 6 weeks (around 8/24/2020) fo

## 2020-07-13 NOTE — PATIENT INSTRUCTIONS
Recommend lean meats such as skinless chicken breast, 90% lean ground beef, lean ground turkey, pork loin, and any type of fish. Bake, broil or grill. No frying. Avoid cooking in oils. Okay to use non-stick cooking spray.     Try to drink 8 setting a date to quit within a month, and do it.     Keys to your quit plan  · Talk to your healthcare provider about prescription medicines and nicotine replacement products that help stop the urge to smoke.   · Join a support group or quit smoking progrroger information is not intended as a substitute for professional medical care. Always follow your healthcare professional's instructions.

## 2020-07-24 ENCOUNTER — HOSPITAL ENCOUNTER (OUTPATIENT)
Dept: CT IMAGING | Age: 55
Discharge: HOME | End: 2020-07-24
Attending: FAMILY MEDICINE
Payer: COMMERCIAL

## 2020-07-24 DIAGNOSIS — F17.210 CIGARETTE SMOKER: ICD-10-CM

## 2020-07-28 ENCOUNTER — TELEPHONE (OUTPATIENT)
Dept: FAMILY MEDICINE CLINIC | Facility: CLINIC | Age: 55
End: 2020-07-28

## 2020-07-29 ENCOUNTER — TELEPHONE (OUTPATIENT)
Dept: FAMILY MEDICINE CLINIC | Facility: CLINIC | Age: 55
End: 2020-07-29

## 2020-07-29 DIAGNOSIS — R91.8 PULMONARY NODULES: Primary | ICD-10-CM

## 2020-07-29 NOTE — TELEPHONE ENCOUNTER
Pt called and said she seen her CT Scan results on line and it says something was found in the abdomen and it may be cancer. She wants to know where to go from here?

## 2020-08-04 ENCOUNTER — HOSPITAL ENCOUNTER (OUTPATIENT)
Dept: CT IMAGING | Facility: HOSPITAL | Age: 55
Discharge: HOME | End: 2020-08-04
Attending: FAMILY MEDICINE
Payer: COMMERCIAL

## 2020-08-04 DIAGNOSIS — R19.00 INTRAABDOMINAL MASS: ICD-10-CM

## 2020-08-04 LAB — CREAT BLD-MCNC: 0.9 MG/DL (ref 0.55–1.02)

## 2020-08-04 PROCEDURE — 74178 CT ABD&PLV WO CNTR FLWD CNTR: CPT | Performed by: FAMILY MEDICINE

## 2020-08-04 PROCEDURE — 82565 ASSAY OF CREATININE: CPT

## 2020-08-07 ENCOUNTER — TELEPHONE (OUTPATIENT)
Dept: FAMILY MEDICINE CLINIC | Facility: CLINIC | Age: 55
End: 2020-08-07

## 2020-08-07 DIAGNOSIS — K86.2 PANCREAS CYST: Primary | ICD-10-CM

## 2020-08-07 NOTE — TELEPHONE ENCOUNTER
Agnes Esposito received her CT scan results through my chart, she is having a difficult time trying to understand them she would like a call from the office to go over it.  Please call Agnes Esposito at [phone]

## 2020-08-07 NOTE — TELEPHONE ENCOUNTER
Spoke to patient and referral information given. VU and is in agreement. Will call back Monday if unable to be seen this coming week.

## 2020-08-24 ENCOUNTER — OFFICE VISIT (OUTPATIENT)
Dept: FAMILY MEDICINE CLINIC | Facility: CLINIC | Age: 55
End: 2020-08-24
Payer: COMMERCIAL

## 2020-08-24 VITALS
SYSTOLIC BLOOD PRESSURE: 140 MMHG | WEIGHT: 252 LBS | BODY MASS INDEX: 43.02 KG/M2 | HEIGHT: 64 IN | DIASTOLIC BLOOD PRESSURE: 96 MMHG | HEART RATE: 80 BPM | TEMPERATURE: 98 F

## 2020-08-24 DIAGNOSIS — E66.01 MORBID OBESITY WITH BMI OF 40.0-44.9, ADULT (HCC): ICD-10-CM

## 2020-08-24 DIAGNOSIS — Z86.39 HISTORY OF HYPOTHYROIDISM: ICD-10-CM

## 2020-08-24 DIAGNOSIS — R79.89 ELEVATED TSH: ICD-10-CM

## 2020-08-24 DIAGNOSIS — F31.60 BIPOLAR AFFECTIVE DISORDER, CURRENT EPISODE MIXED, CURRENT EPISODE SEVERITY UNSPECIFIED (HCC): ICD-10-CM

## 2020-08-24 DIAGNOSIS — I10 ESSENTIAL HYPERTENSION: Primary | ICD-10-CM

## 2020-08-24 DIAGNOSIS — R63.5 WEIGHT GAIN: ICD-10-CM

## 2020-08-24 PROCEDURE — 3008F BODY MASS INDEX DOCD: CPT | Performed by: FAMILY MEDICINE

## 2020-08-24 PROCEDURE — 3077F SYST BP >= 140 MM HG: CPT | Performed by: FAMILY MEDICINE

## 2020-08-24 PROCEDURE — 93000 ELECTROCARDIOGRAM COMPLETE: CPT | Performed by: FAMILY MEDICINE

## 2020-08-24 PROCEDURE — 3080F DIAST BP >= 90 MM HG: CPT | Performed by: FAMILY MEDICINE

## 2020-08-24 PROCEDURE — 99214 OFFICE O/P EST MOD 30 MIN: CPT | Performed by: FAMILY MEDICINE

## 2020-08-24 RX ORDER — HYDROCHLOROTHIAZIDE 12.5 MG/1
12.5 TABLET ORAL EVERY MORNING
Qty: 30 TABLET | Refills: 1 | Status: SHIPPED | OUTPATIENT
Start: 2020-08-24 | End: 2020-10-19

## 2020-08-24 RX ORDER — ESOMEPRAZOLE MAGNESIUM 20 MG/1
1 TABLET, DELAYED RELEASE ORAL DAILY
COMMUNITY

## 2020-08-24 NOTE — PROGRESS NOTES
Marcus Rodríguez is a 54year old female. HPI:     HTN:    Uncontrolled. Lack of improvement with increasing lisinopril to 20 mg daily from 10 mg daily. Bipolar disorder:  Patient under the care of psychiatrist Dr. Evie Fernandez on.   Recently started on A Oral Tab Take 10 mg by mouth daily. • Vitamin B-1 100 MG Oral Tab Take 100 mg by mouth daily. 1   • escitalopram 5 MG Oral Tab Take 5 mg by mouth daily.         Past Medical History:   Diagnosis Date   • Essential hypertension 8/18/2017   • Gallstone p History  Administered            Date(s) Administered    TDAP                  08/18/2017      EXAM:   BP (!) 140/96 (BP Location: Left arm, Patient Position: Sitting, Cuff Size: adult)   Pulse 80   Temp 98.2 °F (36.8 °C) (Skin)   Ht 64\"   Wt 252 lb (114. medial anterior margin of the spleen. No pancreatic   ductal dilatation. The mass does not involve the superior mesenteric artery or portal vein. SPLEEN:  Mildly enlarged. No mass.   The splenic vein is occluded at the splenic hilum, with multiple varic Mildly prominent hepatic lymph nodes. 4. Mild splenomegaly. 5. Compression fractures of superior endplates of U35 and L1 are of indeterminate age. There is also a Schmorl's node on the right side of L4.   If there is concern for recent osteoporotic compr INDICATIONS:   31-year-old male -pack year  Asymptomatic. Low dose lung screening (). Personal history of nicotine dependence (Z87.91). TECHNIQUE:   Non-contrast, helical, low-dose CT (LDCT) chest per standard departmental protocol.   Automate Short Axis                  5.6 mm                          Average Diameter            7 mm                            Lung-RADS Category          3                          ----------------------------------------------------------  2    Comment RECOMMENDATIONS:    LUNG-RADS 3-Probably benign:  Follow-up LDCT Chest in 6 months.       Recommend follow-up dedicated CT of the abdomen using pancreatic mass protocol for further evaluation of the 6.4 cm mass in the left upper quadrant abutting the sple treatment of hypertension complications of untreated hypertension, obesity, complications of obesity. 1. Essential hypertension  Uncontrolled. No change in blood pressure reading with increasing lisinopril to 20 mg daily from 10 mg daily.   Add hydrochl to 6 weeks for hypertension. Francois Cordoba

## 2020-08-24 NOTE — PATIENT INSTRUCTIONS
-Continue taking the lisinopril 20 mg nightly.  -Start hydrochlorothiazide 12.5 mg, take in the morning.  -Do your blood test in approximately 1 week.   -We will likely restart the levothyroxine when you receive the results of your blood sam Feeling lonely or bored Call a friend to talk   Tips for quitting successfully  · List the benefits of quitting such as reducing heart risks and saving money. Keep this list and review it whenever you feel like smoking. · Get support.  Let your friends kno

## 2020-08-30 DIAGNOSIS — I10 ESSENTIAL HYPERTENSION: ICD-10-CM

## 2020-08-31 RX ORDER — LISINOPRIL 10 MG/1
TABLET ORAL
Qty: 90 TABLET | Refills: 0 | OUTPATIENT
Start: 2020-08-31

## 2020-09-02 ENCOUNTER — TELEPHONE (OUTPATIENT)
Dept: FAMILY MEDICINE CLINIC | Facility: CLINIC | Age: 55
End: 2020-09-02

## 2020-09-02 NOTE — TELEPHONE ENCOUNTER
Received document from [facility]. Patient was seen in the office 09/02/202 For a lung nodule/mass. Gave to Dr Akilah Madera to review, in red folder.

## 2020-09-14 LAB
BUN: 16 MG/DL (ref 7–25)
CALCIUM: 9.3 MG/DL (ref 8.6–10.4)
CARBON DIOXIDE: 29 MMOL/L (ref 20–32)
CHLORIDE: 102 MMOL/L (ref 98–110)
CREATININE: 0.82 MG/DL (ref 0.5–1.05)
EGFR IF AFRICN AM: 93 ML/MIN/1.73M2
EGFR IF NONAFRICN AM: 81 ML/MIN/1.73M2
GLUCOSE: 126 MG/DL (ref 65–99)
POTASSIUM: 4.1 MMOL/L (ref 3.5–5.3)
SODIUM: 138 MMOL/L (ref 135–146)
T4, FREE: 1 NG/DL (ref 0.8–1.8)
THYROGLOBULIN ANTIBODIES: 1 IU/ML
THYROID PEROXIDASE$ANTIBODIES: <1 IU/ML
TSH: 3.3 MIU/L

## 2020-09-18 PROBLEM — K86.2 CYST OF PANCREAS: Status: ACTIVE | Noted: 2020-09-18

## 2020-09-18 PROBLEM — K86.2 CYST OF PANCREAS (HCC): Status: ACTIVE | Noted: 2020-09-18

## 2020-10-08 RX ORDER — ZIPRASIDONE HYDROCHLORIDE 40 MG/1
40 CAPSULE ORAL DAILY
COMMUNITY

## 2020-10-18 DIAGNOSIS — I10 ESSENTIAL HYPERTENSION: ICD-10-CM

## 2020-10-19 ENCOUNTER — OFFICE VISIT (OUTPATIENT)
Dept: FAMILY MEDICINE CLINIC | Facility: CLINIC | Age: 55
End: 2020-10-19
Payer: COMMERCIAL

## 2020-10-19 VITALS
HEIGHT: 64 IN | SYSTOLIC BLOOD PRESSURE: 124 MMHG | WEIGHT: 251 LBS | BODY MASS INDEX: 42.85 KG/M2 | TEMPERATURE: 98 F | OXYGEN SATURATION: 98 % | DIASTOLIC BLOOD PRESSURE: 82 MMHG | HEART RATE: 87 BPM

## 2020-10-19 DIAGNOSIS — Z79.899 ENCOUNTER FOR LONG-TERM CURRENT USE OF MEDICATION: ICD-10-CM

## 2020-10-19 DIAGNOSIS — I10 ESSENTIAL HYPERTENSION: Primary | ICD-10-CM

## 2020-10-19 DIAGNOSIS — Z23 NEED FOR VACCINATION: ICD-10-CM

## 2020-10-19 PROCEDURE — 99213 OFFICE O/P EST LOW 20 MIN: CPT | Performed by: FAMILY MEDICINE

## 2020-10-19 PROCEDURE — 90686 IIV4 VACC NO PRSV 0.5 ML IM: CPT | Performed by: FAMILY MEDICINE

## 2020-10-19 PROCEDURE — 3008F BODY MASS INDEX DOCD: CPT | Performed by: FAMILY MEDICINE

## 2020-10-19 PROCEDURE — 3079F DIAST BP 80-89 MM HG: CPT | Performed by: FAMILY MEDICINE

## 2020-10-19 PROCEDURE — 90471 IMMUNIZATION ADMIN: CPT | Performed by: FAMILY MEDICINE

## 2020-10-19 PROCEDURE — 3074F SYST BP LT 130 MM HG: CPT | Performed by: FAMILY MEDICINE

## 2020-10-19 RX ORDER — HYDROCHLOROTHIAZIDE 12.5 MG/1
TABLET ORAL
Qty: 90 TABLET | Refills: 0 | Status: SHIPPED | OUTPATIENT
Start: 2020-10-19 | End: 2020-10-19 | Stop reason: ALTCHOICE

## 2020-10-19 RX ORDER — LISINOPRIL AND HYDROCHLOROTHIAZIDE 20; 12.5 MG/1; MG/1
1 TABLET ORAL EVERY MORNING
Qty: 90 TABLET | Refills: 3 | Status: SHIPPED | OUTPATIENT
Start: 2020-10-19 | End: 2021-09-17

## 2020-10-19 NOTE — PROGRESS NOTES
Dorian Hill is a 54year old female. HPI:     HTN:    Stable. Severity is mild, patient is on 2 agents for her hypertension. Pt has been taking medications as instructed, no medication side effects.    Home BP monitoring in the range of 107-140 daily.     • Vitamin B-1 100 MG Oral Tab Take 100 mg by mouth daily.   1      Past Medical History:   Diagnosis Date   • Abdominal distention 1 year ago   • Anxiety    • Arthritis 2 years   • Back pain 5 years   • Back problem    • Bloating last few months Daily use        Family History   Problem Relation Age of Onset   • Hypertension Mother    • Other (Aneurysm) Mother 43   • Hypertension Father    • Stroke Father    • Hypertension Sister    • Seizure Disorder Brother    • Other (Arteriovenous Malformation mg/dL 16   CREATININE      0.50 - 1.05 mg/dL 0.82   eGFR NON-AFR.  AMERICAN      > OR = 60 mL/min/1.73m2 81   eGFR AFRICAN AMERICAN      > OR = 60 mL/min/1.73m2 93   Bun/Creatinine Ratio      6 - 22 (calc) NOT APPLICABLE   SODIUM, SERUM      135 - 146 mmol/ Lisinopril-hydroCHLOROthiazide 20-12.5 MG Oral Tab 90 tablet 3     Sig: Take 1 tablet by mouth every morning.        Imaging & Consults:  FLULAVAL INFLUENZA VACCINE QUAD PRESERVATIVE FREE 0.5 ML    Return in about 6 months (around 4/19/2021) for Hypertensio

## 2020-10-24 ENCOUNTER — APPOINTMENT (OUTPATIENT)
Dept: LAB | Age: 55
End: 2020-10-24
Attending: INTERNAL MEDICINE
Payer: COMMERCIAL

## 2020-10-24 DIAGNOSIS — K86.2 CYST OF PANCREAS: ICD-10-CM

## 2020-10-27 ENCOUNTER — HOSPITAL ENCOUNTER (OUTPATIENT)
Facility: HOSPITAL | Age: 55
Setting detail: HOSPITAL OUTPATIENT SURGERY
Discharge: HOME | End: 2020-10-27
Attending: INTERNAL MEDICINE | Admitting: INTERNAL MEDICINE
Payer: COMMERCIAL

## 2020-10-27 ENCOUNTER — ANESTHESIA EVENT (OUTPATIENT)
Dept: ENDOSCOPY | Facility: HOSPITAL | Age: 55
End: 2020-10-27
Payer: COMMERCIAL

## 2020-10-27 ENCOUNTER — ANESTHESIA (OUTPATIENT)
Dept: ENDOSCOPY | Facility: HOSPITAL | Age: 55
End: 2020-10-27
Payer: COMMERCIAL

## 2020-10-27 VITALS
HEIGHT: 64 IN | OXYGEN SATURATION: 99 % | WEIGHT: 242 LBS | DIASTOLIC BLOOD PRESSURE: 88 MMHG | HEART RATE: 70 BPM | BODY MASS INDEX: 41.32 KG/M2 | TEMPERATURE: 98 F | SYSTOLIC BLOOD PRESSURE: 135 MMHG | RESPIRATION RATE: 16 BRPM

## 2020-10-27 DIAGNOSIS — K86.2 CYST OF PANCREAS: Primary | ICD-10-CM

## 2020-10-27 PROCEDURE — 87075 CULTR BACTERIA EXCEPT BLOOD: CPT | Performed by: INTERNAL MEDICINE

## 2020-10-27 PROCEDURE — 87106 FUNGI IDENTIFICATION YEAST: CPT | Performed by: ANESTHESIOLOGY

## 2020-10-27 PROCEDURE — 0F9G8ZX DRAINAGE OF PANCREAS, VIA NATURAL OR ARTIFICIAL OPENING ENDOSCOPIC, DIAGNOSTIC: ICD-10-PCS | Performed by: INTERNAL MEDICINE

## 2020-10-27 PROCEDURE — 87205 SMEAR GRAM STAIN: CPT | Performed by: ANESTHESIOLOGY

## 2020-10-27 PROCEDURE — 87077 CULTURE AEROBIC IDENTIFY: CPT | Performed by: ANESTHESIOLOGY

## 2020-10-27 PROCEDURE — 87070 CULTURE OTHR SPECIMN AEROBIC: CPT | Performed by: ANESTHESIOLOGY

## 2020-10-27 RX ORDER — LIDOCAINE HYDROCHLORIDE 10 MG/ML
INJECTION, SOLUTION EPIDURAL; INFILTRATION; INTRACAUDAL; PERINEURAL AS NEEDED
Status: DISCONTINUED | OUTPATIENT
Start: 2020-10-27 | End: 2020-10-27 | Stop reason: SURG

## 2020-10-27 RX ORDER — SODIUM CHLORIDE, SODIUM LACTATE, POTASSIUM CHLORIDE, CALCIUM CHLORIDE 600; 310; 30; 20 MG/100ML; MG/100ML; MG/100ML; MG/100ML
INJECTION, SOLUTION INTRAVENOUS CONTINUOUS
Status: DISCONTINUED | OUTPATIENT
Start: 2020-10-27 | End: 2020-10-27

## 2020-10-27 RX ADMIN — SODIUM CHLORIDE, SODIUM LACTATE, POTASSIUM CHLORIDE, CALCIUM CHLORIDE: 600; 310; 30; 20 INJECTION, SOLUTION INTRAVENOUS at 14:20:00

## 2020-10-27 RX ADMIN — LIDOCAINE HYDROCHLORIDE 50 MG: 10 INJECTION, SOLUTION EPIDURAL; INFILTRATION; INTRACAUDAL; PERINEURAL at 13:54:00

## 2020-10-27 NOTE — OPERATIVE REPORT
Sophie De La Rosa Patient Status:  Hospital Outpatient Surgery    1965 MRN [id number]   [facility] ENDOSCOPY Attending Norma Fitzgerald MD   Date 10/27/2020 PCP Gerda Ovalles DO     PREOPERATIVE DIAGNOSIS/INDICATION: Abnormal CT: chronic liver disease, limited visualized pancreatic parenchyma due to body habitus, hyperechoic stranding, tail atrophy and replacement by a a large 6 cm anechoic simple cyst with internal debris, no solid component, no mural nodules  THERAPEUTICS: FNA, 2

## 2020-10-27 NOTE — H&P
[facility] Patient Status:  Hospital Outpatient Surgery    1965 MRN [id number]   [facility] ENDOSCOPY Attending Michael Mendez MD   Date 10/27/2020 PCP Cate Ortiz DO     CC: Pancreas c Hypertension Mother    • Other (Aneurysm) Mother 43   • Hypertension Father    • Stroke Father    • Hypertension Sister    • Seizure Disorder Brother    • Other (Arteriovenous Malformation) Brother    • Thyroid Disorder Sister         Hypothyroid   • Rodrigo Thrombocytopenia ([facility] [address]     History of fracture of right hip     Cigarette smoker     Osteopenia of right hip     Mixed hyperlipidemia     Anisocoria     Encounter for screening colonoscopy     Tinnitus of both ears     Morbid obesity with BMI of 40.0-44.

## 2020-10-27 NOTE — ANESTHESIA POSTPROCEDURE EVALUATION
[facility] Albina Comings Patient Status:  Hospital Outpatient Surgery   Age/Gender 54year old female MRN [id number]   Location [address] [facility] Attending Ian Lynn MD   Hosp Day # 0 PCP Sana Kwok DO       Anesthesia Post

## 2020-10-27 NOTE — ANESTHESIA PREPROCEDURE EVALUATION
PRE-OP EVALUATION    Patient Name: Ele Spears    Pre-op Diagnosis: Cyst of pancreas [K86.2]    Procedure(s):  ENDOSCOPIC ULTRASOUND    Surgeon(s) and Role:     Jose Rodgers MD - Primary    Pre-op vitals reviewed.   Temp: 97.1 °F (36.2 °C)  Pul distance: > 6 cm  Neck ROM: full Cardiovascular    Cardiovascular exam normal.         Dental    No notable dental history.          Pulmonary    Pulmonary exam normal.                 Other findings            ASA: 3   Plan: MAC  NPO status verified and pa

## 2020-11-24 ENCOUNTER — TELEPHONE (OUTPATIENT)
Dept: FAMILY MEDICINE CLINIC | Facility: CLINIC | Age: 55
End: 2020-11-24

## 2020-11-24 NOTE — TELEPHONE ENCOUNTER
Agnes Esposito is calling to let Dr Leeanne Salinas know that her blood pressure has been around 87/60 she feels dizzy and disoriented, when Agnes Esposito was in about a month ago Dr Leeanne Salinas doubled her Lisinopril and added a Hydroclorithizide, she is not feeling well and wo

## 2020-11-24 NOTE — TELEPHONE ENCOUNTER
Please call patient and verify that she is only taking the combination tablet lisinopril–hydrochlorothiazide 20-12.5 mg once a day and verify that she is not taking either one of the separate lisinopril or hydrochlorothiazide tablets.

## 2020-11-24 NOTE — TELEPHONE ENCOUNTER
Spoke to patient. Advised her dose was not \"doubled\" at last visit. Advised she has been on this dose for several months. She c/o \"rubbery legs\" and a little dizzy when she stands and walks around. Comes and goes, is not all the time.  No chest pain o

## 2020-11-24 NOTE — TELEPHONE ENCOUNTER
Spoke to patient to clarify meds. She says she is only taking the combo lisinopril-hctz pill. She accidentally filled the other meds but realized her mistake and never took them.   She said her psychiatrist increased her ziprasidone to 40mg daily and she

## 2020-11-24 NOTE — TELEPHONE ENCOUNTER
Recommend patient to the followin. Instruct patient to take one half of the combination lisinopril–hydrochlorothiazide 20–12.5 mg tab once a day, we will see if this helps.    2.  Check blood pressure once a day and when symptomatic, keep a written re

## 2020-12-22 DIAGNOSIS — Z87.81 HISTORY OF FRACTURE OF RIGHT HIP: ICD-10-CM

## 2020-12-22 DIAGNOSIS — M25.551 LATERAL PAIN OF RIGHT HIP: ICD-10-CM

## 2020-12-23 NOTE — TELEPHONE ENCOUNTER
Requested Prescriptions     Pending Prescriptions Disp Refills   • MELOXICAM 7.5 MG Oral Tab [Pharmacy Med Name: MELOXICAM 7.5MG TABLETS] 30 tablet 1     Sig: TAKE 1 TABLET(7.5 MG) BY MOUTH DAILY     Last fill 7/13/20 30 tab 1 refill  Last OV 10/19/20  No

## 2020-12-26 RX ORDER — MELOXICAM 7.5 MG/1
7.5 TABLET ORAL DAILY PRN
Qty: 30 TABLET | Refills: 0 | Status: SHIPPED | OUTPATIENT
Start: 2020-12-26 | End: 2021-09-17

## 2021-01-13 ENCOUNTER — TELEPHONE (OUTPATIENT)
Dept: FAMILY MEDICINE CLINIC | Facility: CLINIC | Age: 56
End: 2021-01-13

## 2021-01-13 NOTE — TELEPHONE ENCOUNTER
Bubba Reynaga M.D. Pulmonary Disease, Critical Care Medicine, [address] [facility]  Phone: [phone]    Left message for patient and Dr. Phoebe Blakely nurse to call back.

## 2021-01-13 NOTE — TELEPHONE ENCOUNTER
Please call patient and also please call patient's pulmonologist's office regarding the below message:    Chart reviewed, there is a CT lung screening ordered by Dr. Sahra Son in epic, however there are no results.   Recommend patient complete repeat CT divine

## 2021-01-14 ENCOUNTER — TELEPHONE (OUTPATIENT)
Dept: FAMILY MEDICINE CLINIC | Facility: CLINIC | Age: 56
End: 2021-01-14

## 2021-01-14 NOTE — TELEPHONE ENCOUNTER
Charissa Christopher 2 minutes ago (10:05 AM)        Zachary Goodrich from Dr Miller Vallecillo office is calling to let Trent Santiago know that Fatoumata Boothe can make a appt anytime after 07/24/21 for 1 year follow up, her PFT is due at anytime, Fatoumata Rook should call the office to let them know

## 2021-01-14 NOTE — TELEPHONE ENCOUNTER
Spoke with patient and relayed message and recommendations. She verbalized understanding and will call Dr. Gwen Saini office for order and follow up.

## 2021-01-14 NOTE — TELEPHONE ENCOUNTER
Lou from Dr Pino Hill office is calling to let Kiana Govea know that Lucina Keys can make a appt anytime after 07/24/21 for 1 year follow up, her PFT is due at anytime, Lucina Keys should call the office to let them know the PFT was completed, and the notes from Se

## 2021-01-18 ENCOUNTER — TELEPHONE (OUTPATIENT)
Dept: FAMILY MEDICINE CLINIC | Facility: CLINIC | Age: 56
End: 2021-01-18

## 2021-01-18 DIAGNOSIS — M25.551 LATERAL PAIN OF RIGHT HIP: Primary | ICD-10-CM

## 2021-01-18 DIAGNOSIS — Z87.81 HISTORY OF FRACTURE OF RIGHT HIP: ICD-10-CM

## 2021-01-18 NOTE — TELEPHONE ENCOUNTER
Spoke to patient. She would like a referral to a different ortho. Dr. Leone Scheuermann did right hip surgery. She says pin is popping up causing pain and inability to lay on that side. Please advise.

## 2021-01-19 NOTE — TELEPHONE ENCOUNTER
Please refer patient to Dr. Argentina Constantino. Dr. Argentina Constantino will most likely need to review patient's op note and recent office visit notes from Dr. Lamonte Rollins, chart was reviewed and I am unable to find any office visit notes or op note from Dr. Lamonte Rollins.   Please ins

## 2021-01-19 NOTE — TELEPHONE ENCOUNTER
Referral placed. Patient given contact info. Also patient will call for records and bring them here. Patient VU and in agreement.

## 2021-03-20 DIAGNOSIS — Z23 NEED FOR VACCINATION: ICD-10-CM

## 2021-09-17 ENCOUNTER — OFFICE VISIT (OUTPATIENT)
Dept: FAMILY MEDICINE CLINIC | Facility: CLINIC | Age: 56
End: 2021-09-17
Payer: COMMERCIAL

## 2021-09-17 VITALS
SYSTOLIC BLOOD PRESSURE: 126 MMHG | BODY MASS INDEX: 36.37 KG/M2 | DIASTOLIC BLOOD PRESSURE: 74 MMHG | WEIGHT: 213 LBS | TEMPERATURE: 97 F | HEIGHT: 64 IN | OXYGEN SATURATION: 96 % | HEART RATE: 90 BPM

## 2021-09-17 DIAGNOSIS — Z79.899 ENCOUNTER FOR LONG-TERM CURRENT USE OF MEDICATION: ICD-10-CM

## 2021-09-17 DIAGNOSIS — Z12.31 ENCOUNTER FOR SCREENING MAMMOGRAM FOR MALIGNANT NEOPLASM OF BREAST: ICD-10-CM

## 2021-09-17 DIAGNOSIS — Z71.6 ENCOUNTER FOR SMOKING CESSATION COUNSELING: ICD-10-CM

## 2021-09-17 DIAGNOSIS — Z23 NEED FOR VACCINATION: ICD-10-CM

## 2021-09-17 DIAGNOSIS — Z87.81 HISTORY OF FRACTURE OF RIGHT HIP: ICD-10-CM

## 2021-09-17 DIAGNOSIS — E78.2 MIXED HYPERLIPIDEMIA: ICD-10-CM

## 2021-09-17 DIAGNOSIS — L40.9 PSORIASIS: ICD-10-CM

## 2021-09-17 DIAGNOSIS — Z28.21 COVID-19 VACCINATION REFUSED: ICD-10-CM

## 2021-09-17 DIAGNOSIS — M85.89 OSTEOPENIA OF MULTIPLE SITES: ICD-10-CM

## 2021-09-17 DIAGNOSIS — Z78.0 POST-MENOPAUSAL: ICD-10-CM

## 2021-09-17 DIAGNOSIS — Z00.00 ROUTINE GENERAL MEDICAL EXAMINATION AT A HEALTH CARE FACILITY: Primary | ICD-10-CM

## 2021-09-17 DIAGNOSIS — I10 ESSENTIAL HYPERTENSION: ICD-10-CM

## 2021-09-17 DIAGNOSIS — F17.210 CIGARETTE SMOKER: ICD-10-CM

## 2021-09-17 DIAGNOSIS — E66.01 CLASS 2 SEVERE OBESITY DUE TO EXCESS CALORIES WITH SERIOUS COMORBIDITY AND BODY MASS INDEX (BMI) OF 36.0 TO 36.9 IN ADULT (HCC): ICD-10-CM

## 2021-09-17 DIAGNOSIS — M25.551 LATERAL PAIN OF RIGHT HIP: ICD-10-CM

## 2021-09-17 DIAGNOSIS — E55.9 VITAMIN D DEFICIENCY: ICD-10-CM

## 2021-09-17 DIAGNOSIS — D50.9 IRON DEFICIENCY ANEMIA, UNSPECIFIED IRON DEFICIENCY ANEMIA TYPE: ICD-10-CM

## 2021-09-17 PROBLEM — E66.812 CLASS 2 SEVERE OBESITY DUE TO EXCESS CALORIES WITH SERIOUS COMORBIDITY AND BODY MASS INDEX (BMI) OF 36.0 TO 36.9 IN ADULT (HCC): Status: ACTIVE | Noted: 2021-09-17

## 2021-09-17 PROBLEM — S72.001A FRACTURE OF RIGHT HIP (HCC): Status: ACTIVE | Noted: 2021-09-17

## 2021-09-17 PROBLEM — Z86.39 HISTORY OF IRON DEFICIENCY: Status: ACTIVE | Noted: 2021-09-17

## 2021-09-17 PROBLEM — T50.902A SUICIDE ATTEMPT BY DRUG INGESTION (HCC): Status: ACTIVE | Noted: 2021-09-17

## 2021-09-17 PROCEDURE — 90472 IMMUNIZATION ADMIN EACH ADD: CPT | Performed by: FAMILY MEDICINE

## 2021-09-17 PROCEDURE — 99396 PREV VISIT EST AGE 40-64: CPT | Performed by: FAMILY MEDICINE

## 2021-09-17 PROCEDURE — 90732 PPSV23 VACC 2 YRS+ SUBQ/IM: CPT | Performed by: FAMILY MEDICINE

## 2021-09-17 PROCEDURE — 99214 OFFICE O/P EST MOD 30 MIN: CPT | Performed by: FAMILY MEDICINE

## 2021-09-17 PROCEDURE — 3074F SYST BP LT 130 MM HG: CPT | Performed by: FAMILY MEDICINE

## 2021-09-17 PROCEDURE — 90471 IMMUNIZATION ADMIN: CPT | Performed by: FAMILY MEDICINE

## 2021-09-17 PROCEDURE — 3008F BODY MASS INDEX DOCD: CPT | Performed by: FAMILY MEDICINE

## 2021-09-17 PROCEDURE — 90686 IIV4 VACC NO PRSV 0.5 ML IM: CPT | Performed by: FAMILY MEDICINE

## 2021-09-17 PROCEDURE — 3078F DIAST BP <80 MM HG: CPT | Performed by: FAMILY MEDICINE

## 2021-09-17 RX ORDER — ESCITALOPRAM OXALATE 20 MG/1
20 TABLET ORAL DAILY
COMMUNITY
Start: 2021-09-04

## 2021-09-17 RX ORDER — LISINOPRIL AND HYDROCHLOROTHIAZIDE 20; 12.5 MG/1; MG/1
1 TABLET ORAL EVERY MORNING
Qty: 90 TABLET | Refills: 3 | Status: SHIPPED | OUTPATIENT
Start: 2021-09-17

## 2021-09-17 RX ORDER — MELOXICAM 7.5 MG/1
7.5 TABLET ORAL DAILY PRN
Qty: 30 TABLET | Refills: 0 | Status: SHIPPED | OUTPATIENT
Start: 2021-09-17

## 2021-09-17 RX ORDER — BUSPIRONE HYDROCHLORIDE 10 MG/1
10 TABLET ORAL 3 TIMES DAILY PRN
COMMUNITY
Start: 2021-09-04

## 2021-09-17 NOTE — PATIENT INSTRUCTIONS
-Your MRI of the pancreas according to your gastroenterologist, Dr. Alon Perez, is due November 2021. Please call Dr. Puja Hussein office next month in anticipation of completing the MRI of the pancreas.                       Prevention G choose to continue yearly. All women should know how their breasts normally look and feel and know the possible benefits and risks of breast cancer screening with mammograms.     Cervical cancer All women in this age group, except women who have had a com smoke or have  quit within past 15 years       · 30-pack-year smoking history  , Eligibility criteria and age limit (possibly up to age [de-identified]) may vary across major organizations  Yearly lung cancer screening with a low-dose CT scan (LDCT) Talk with your heal infection  PCV13: 1 dose ages 23 to 59 (protects against 13 types of pneumococcal bacteria)   PPSV23: 1 or 2 doses through age 64(protects against 23 types of pneumococcal bacteria)   Talk with your healthcare provider   Tetanus/diphtheria/pertussis (Td/Td

## 2021-09-17 NOTE — PROGRESS NOTES
HPI:   Shanice Gleason is a 64year old female     Hip pain:  Right side. Chronic since hip frx 5/2019. Symptoms: denies discharge, itching, burning or dysuria.      Last PAP: 2018  Abnormal PAP: never    Last colonoscopy: UTD  Last mammogram: Due mg by mouth 3 (three) times daily as needed. • escitalopram 20 MG Oral Tab Take 20 mg by mouth daily. • Ziprasidone HCl 20 MG Oral Cap Take 40 mg by mouth daily.       • Esomeprazole Magnesium (NEXIUM 24HR) 20 MG Oral Tab EC Take 1 tablet by mouth d 5/13/2018      Past Surgical History:   Procedure Laterality Date   • BONE DENSITY TEST SCAN Bilateral 02/11/2020   • COLONOSCOPY N/A 8/22/2019    Procedure: COLONOSCOPY, POSSIBLE BIOPSY, POSSIBLE POLYPECTOMY 05033;  Surgeon: Fide Martins MD;  Location: complaints of swollen glands      EXAM:   /74 (BP Location: Left arm, Patient Position: Sitting, Cuff Size: adult)   Pulse 90   Temp 97.2 °F (36.2 °C)   Ht 5' 4\" (1.626 m)   Wt 213 lb (96.6 kg)   LMP  (LMP Unknown)   SpO2 96%   Breastfeeding No   BM severe obesity due to excess calories with serious comorbidity and body mass index (bmi) of 36.0 to 36.9 in adult (hcc)  Cigarette smoker  Encounter for smoking cessation counseling  Lateral pain of right hip  History of fracture of right hip  Psoriasis  O 8. Cigarette smoker  9. Encounter for smoking cessation counseling  Smoking cessation advised. Patient not interested in smoking cessation. CT lung screening for lung cancer ordered and pending.    - CT LUNG LD SCREENING(CPT=71271); Future      10.  L Take 1 tablet (7.5 mg total) by mouth daily as needed for Pain. • Lisinopril-hydroCHLOROthiazide 20-12.5 MG Oral Tab 90 tablet 3     Sig: Take 1 tablet by mouth every morning.        Imaging & Consults:  ORTHOPEDIC - INTERNAL  PNEUMOCOCCAL IMM (PNEUMOVAX)

## 2021-09-23 LAB
% SATURATION: 30 % (CALC) (ref 16–45)
ABSOLUTE BASOPHILS: 41 CELLS/UL (ref 0–200)
ABSOLUTE EOSINOPHILS: 112 CELLS/UL (ref 15–500)
ABSOLUTE LYMPHOCYTES: 1050 CELLS/UL (ref 850–3900)
ABSOLUTE MONOCYTES: 389 CELLS/UL (ref 200–950)
ABSOLUTE NEUTROPHILS: 4307 CELLS/UL (ref 1500–7800)
ALBUMIN/GLOBULIN RATIO: 1.2 (CALC) (ref 1–2.5)
ALBUMIN: 3.8 G/DL (ref 3.6–5.1)
ALKALINE PHOSPHATASE: 161 U/L (ref 37–153)
ALT: 19 U/L (ref 6–29)
AST: 41 U/L (ref 10–35)
BASOPHILS: 0.7 %
BILIRUBIN, TOTAL: 1.1 MG/DL (ref 0.2–1.2)
BUN: 23 MG/DL (ref 7–25)
CALCIUM: 9 MG/DL (ref 8.6–10.4)
CARBON DIOXIDE: 25 MMOL/L (ref 20–32)
CHLORIDE: 103 MMOL/L (ref 98–110)
CHOL/HDLC RATIO: 4.5 (CALC)
CHOLESTEROL, TOTAL: 165 MG/DL
CREATININE: 0.99 MG/DL (ref 0.5–1.05)
EGFR IF AFRICN AM: 74 ML/MIN/1.73M2
EGFR IF NONAFRICN AM: 64 ML/MIN/1.73M2
EOSINOPHILS: 1.9 %
FERRITIN: 121 NG/ML (ref 16–232)
FOLATE, SERUM: 5.2 NG/ML
GLOBULIN: 3.3 G/DL (CALC) (ref 1.9–3.7)
GLUCOSE: 135 MG/DL (ref 65–99)
HDL CHOLESTEROL: 37 MG/DL
HEMATOCRIT: 42 % (ref 35–45)
HEMOGLOBIN: 13.8 G/DL (ref 11.7–15.5)
IRON BINDING CAPACITY: 363 MCG/DL (CALC) (ref 250–450)
IRON, TOTAL: 109 MCG/DL (ref 45–160)
LDL-CHOLESTEROL: 101 MG/DL (CALC)
LYMPHOCYTES: 17.8 %
MCH: 32.4 PG (ref 27–33)
MCHC: 32.9 G/DL (ref 32–36)
MCV: 98.6 FL (ref 80–100)
MONOCYTES: 6.6 %
MPV: 10 FL (ref 7.5–12.5)
NEUTROPHILS: 73 %
NON-HDL CHOLESTEROL: 128 MG/DL (CALC)
PLATELET COUNT: 124 THOUSAND/UL (ref 140–400)
POTASSIUM: 3.8 MMOL/L (ref 3.5–5.3)
PROTEIN, TOTAL: 7.1 G/DL (ref 6.1–8.1)
RDW: 12.2 % (ref 11–15)
RED BLOOD CELL COUNT: 4.26 MILLION/UL (ref 3.8–5.1)
SODIUM: 137 MMOL/L (ref 135–146)
T4, FREE: 1 NG/DL (ref 0.8–1.8)
TRIGLYCERIDES: 171 MG/DL
TSH: 4.27 MIU/L (ref 0.4–4.5)
VITAMIN B12: 865 PG/ML (ref 200–1100)
VITAMIN D, 25-OH, TOTAL: 39 NG/ML (ref 30–100)
WHITE BLOOD CELL COUNT: 5.9 THOUSAND/UL (ref 3.8–10.8)

## 2021-11-04 ENCOUNTER — TELEPHONE (OUTPATIENT)
Dept: FAMILY MEDICINE CLINIC | Facility: CLINIC | Age: 56
End: 2021-11-04

## 2021-11-04 NOTE — TELEPHONE ENCOUNTER
Received signed medical records request/authorization form for [facility] to disclose patient health information to the Facility identified below:     Facility / Provider Name: [facility]  Of [address] [place] [address]  Facility Fax

## 2021-11-11 ENCOUNTER — OFFICE VISIT (OUTPATIENT)
Dept: FAMILY MEDICINE CLINIC | Facility: CLINIC | Age: 56
End: 2021-11-11
Payer: COMMERCIAL

## 2021-11-11 VITALS
HEART RATE: 90 BPM | WEIGHT: 213 LBS | BODY MASS INDEX: 37 KG/M2 | SYSTOLIC BLOOD PRESSURE: 124 MMHG | TEMPERATURE: 98 F | DIASTOLIC BLOOD PRESSURE: 68 MMHG | OXYGEN SATURATION: 97 %

## 2021-11-11 DIAGNOSIS — R73.01 IMPAIRED FASTING GLUCOSE: ICD-10-CM

## 2021-11-11 DIAGNOSIS — Z87.81 HISTORY OF FRACTURE OF RIGHT HIP: ICD-10-CM

## 2021-11-11 DIAGNOSIS — M25.551 LATERAL PAIN OF RIGHT HIP: Primary | ICD-10-CM

## 2021-11-11 PROBLEM — D50.9 IRON DEFICIENCY ANEMIA: Status: RESOLVED | Noted: 2017-09-08 | Resolved: 2021-11-11

## 2021-11-11 PROCEDURE — 83036 HEMOGLOBIN GLYCOSYLATED A1C: CPT | Performed by: FAMILY MEDICINE

## 2021-11-11 PROCEDURE — 99214 OFFICE O/P EST MOD 30 MIN: CPT | Performed by: FAMILY MEDICINE

## 2021-11-11 PROCEDURE — 3074F SYST BP LT 130 MM HG: CPT | Performed by: FAMILY MEDICINE

## 2021-11-11 PROCEDURE — 3078F DIAST BP <80 MM HG: CPT | Performed by: FAMILY MEDICINE

## 2021-11-11 NOTE — PROGRESS NOTES
Karen Armendariz is a 64year old female. HPI:       Patient complains of chronic right hip pain. Patient states she saw a  and is applying for short-term disability.         Wt Readings from Last 6 Encounters:  11/11/21 : 213 lb (96.6 kg)  09/17 2015    StSilvio Hodgkins   • Hearing loss 3 years   • Heartburn Always   • Hemorrhoids always   • High blood pressure    • Hip fracture, right ([address] 05/2019   • History of depression    • History of fracture of right hip 6/28/2019 5/13/2019 with ORIF on 5/15/ psychiatrist.      Immunization History  Administered            Date(s) Administered    FLULAVAL 6 months & older 0.5 ml Prefilled syringe (67469)                          10/19/2020  09/17/2021      Pneumovax 23 09/17/2021      TDAP Sodium      135 - 146 mmol/L  137    Potassium      3.5 - 5.3 mmol/L  3.8    Chloride      98 - 110 mmol/L  103    Carbon Dioxide, Total      20 - 32 mmol/L  25    CALCIUM      8.6 - 10.4 mg/dL  9.0    PROTEIN, TOTAL      6.1 - 8.1 g/dL  7.1    Albumin 0. 6   COMMENT(S)          Glucose      65 - 99 mg/dL 258 (H)   BUN      7 - 25 mg/dL 13   CREATININE      0.50 - 1.05 mg/dL 0.78   eGFR NON-AFR.  AMERICAN      > OR = 60 mL/min/1.73m2 86   eGFR AFRICAN AMERICAN      > OR = 60 mL/min/1.73m2 99   BUN/CREATINI record release to obtain hospitalization records from [facility] relating to history of hip fracture.   Recommend functional capacity evaluation and referral to physiatrist.  Please schedule your functional capacity evaluation

## 2021-11-11 NOTE — PATIENT INSTRUCTIONS
-Please schedule your functional capacity evaluation to be done prior to your appointment with the physiatrist.

## 2022-03-29 ENCOUNTER — HOSPITAL ENCOUNTER (OUTPATIENT)
Age: 57
Discharge: HOME | End: 2022-03-29
Payer: COMMERCIAL

## 2022-03-29 VITALS
OXYGEN SATURATION: 98 % | SYSTOLIC BLOOD PRESSURE: 138 MMHG | TEMPERATURE: 98 F | HEART RATE: 92 BPM | DIASTOLIC BLOOD PRESSURE: 96 MMHG | RESPIRATION RATE: 14 BRPM

## 2022-03-29 DIAGNOSIS — B34.9 VIRAL SYNDROME: Primary | ICD-10-CM

## 2022-03-29 LAB
POCT INFLUENZA A: NEGATIVE
POCT INFLUENZA B: NEGATIVE
SARS-COV-2 RNA RESP QL NAA+PROBE: NOT DETECTED

## 2022-03-29 PROCEDURE — 87502 INFLUENZA DNA AMP PROBE: CPT | Performed by: NURSE PRACTITIONER

## 2022-03-29 PROCEDURE — 99213 OFFICE O/P EST LOW 20 MIN: CPT

## 2022-03-29 RX ORDER — BUPROPION HYDROCHLORIDE 150 MG/1
50 TABLET ORAL DAILY
COMMUNITY
Start: 2022-03-08

## 2022-06-07 ENCOUNTER — TELEPHONE (OUTPATIENT)
Dept: FAMILY MEDICINE CLINIC | Facility: CLINIC | Age: 57
End: 2022-06-07

## 2022-06-07 NOTE — TELEPHONE ENCOUNTER
Received medical record release from    [facility] 9/18/2021  Thru    present     Sent to scan scat and copy went to scanning    Red tag# [id number]

## 2022-11-26 DIAGNOSIS — I10 ESSENTIAL HYPERTENSION: ICD-10-CM

## 2022-11-26 DIAGNOSIS — Z79.899 ENCOUNTER FOR LONG-TERM CURRENT USE OF MEDICATION: ICD-10-CM

## 2022-11-29 RX ORDER — LISINOPRIL AND HYDROCHLOROTHIAZIDE 20; 12.5 MG/1; MG/1
1 TABLET ORAL EVERY MORNING
Qty: 15 TABLET | Refills: 0 | Status: SHIPPED | OUTPATIENT
Start: 2022-11-29

## 2023-02-06 DIAGNOSIS — Z79.899 ENCOUNTER FOR LONG-TERM CURRENT USE OF MEDICATION: ICD-10-CM

## 2023-02-06 DIAGNOSIS — I10 ESSENTIAL HYPERTENSION: ICD-10-CM

## 2023-02-06 RX ORDER — LISINOPRIL AND HYDROCHLOROTHIAZIDE 20; 12.5 MG/1; MG/1
1 TABLET ORAL EVERY MORNING
Qty: 15 TABLET | Refills: 0 | Status: SHIPPED | OUTPATIENT
Start: 2023-02-06

## 2023-02-06 NOTE — TELEPHONE ENCOUNTER
Pt scheduled apt for 3/16/23. States  just got surgery and can not come in February. Pt states she is out of med and is wondering if we can refill until she is able to come in.

## 2023-02-06 NOTE — TELEPHONE ENCOUNTER
Please call patient and schedule an office visit. Her last OV was 11/11/21. She had 2 no shows after that visit. Route back to me once appointment is made.

## 2023-02-20 DIAGNOSIS — Z79.899 ENCOUNTER FOR LONG-TERM CURRENT USE OF MEDICATION: ICD-10-CM

## 2023-02-20 DIAGNOSIS — I10 ESSENTIAL HYPERTENSION: ICD-10-CM

## 2023-02-20 NOTE — TELEPHONE ENCOUNTER
----- Message from Tonya Diaz DO sent at 7/1/2019 11:36 PM CDT -----  Please call patient and be sure to speak with her directly: Please have patient make appointment to see me to discuss iron deficiency anemia and abnormal cholesterol panel.   It is LEFT INDEX FINGER: +3.5cm L-shaped skin flap laceration noted radial aspect of distal phalanx of left 2nd digit. Minimal bleeding. No tendon deficits. FROM. NVI. Cap refill less than 2 sec./finger

## 2023-02-22 RX ORDER — LISINOPRIL AND HYDROCHLOROTHIAZIDE 20; 12.5 MG/1; MG/1
1 TABLET ORAL DAILY
Qty: 30 TABLET | Refills: 0 | OUTPATIENT
Start: 2023-02-22

## 2023-02-23 NOTE — TELEPHONE ENCOUNTER
Refill for lisinopril-hydrochlorothiazide refused. Please inform patient medication refill was refused. Please note patient no showed for appointments 4/1/2022 and 8/8/2022 and has not been seen in the office since 2021.

## 2023-03-16 ENCOUNTER — OFFICE VISIT (OUTPATIENT)
Dept: FAMILY MEDICINE CLINIC | Facility: CLINIC | Age: 58
End: 2023-03-16
Payer: COMMERCIAL

## 2023-03-16 VITALS
WEIGHT: 205.38 LBS | RESPIRATION RATE: 20 BRPM | HEIGHT: 64 IN | BODY MASS INDEX: 35.06 KG/M2 | OXYGEN SATURATION: 99 % | HEART RATE: 88 BPM | TEMPERATURE: 97 F | SYSTOLIC BLOOD PRESSURE: 130 MMHG | DIASTOLIC BLOOD PRESSURE: 80 MMHG

## 2023-03-16 DIAGNOSIS — Z00.00 ROUTINE GENERAL MEDICAL EXAMINATION AT A HEALTH CARE FACILITY: ICD-10-CM

## 2023-03-16 DIAGNOSIS — E66.01 CLASS 2 SEVERE OBESITY DUE TO EXCESS CALORIES WITH SERIOUS COMORBIDITY AND BODY MASS INDEX (BMI) OF 35.0 TO 35.9 IN ADULT (HCC): ICD-10-CM

## 2023-03-16 DIAGNOSIS — Z71.6 ENCOUNTER FOR SMOKING CESSATION COUNSELING: ICD-10-CM

## 2023-03-16 DIAGNOSIS — R63.5 WEIGHT GAIN: ICD-10-CM

## 2023-03-16 DIAGNOSIS — Z23 NEED FOR VACCINATION: ICD-10-CM

## 2023-03-16 DIAGNOSIS — I10 ESSENTIAL HYPERTENSION: ICD-10-CM

## 2023-03-16 DIAGNOSIS — E78.5 DYSLIPIDEMIA: ICD-10-CM

## 2023-03-16 DIAGNOSIS — Z12.31 ENCOUNTER FOR SCREENING MAMMOGRAM FOR MALIGNANT NEOPLASM OF BREAST: ICD-10-CM

## 2023-03-16 DIAGNOSIS — Z01.419 ENCOUNTER FOR ROUTINE GYNECOLOGICAL EXAMINATION WITH PAPANICOLAOU SMEAR OF CERVIX: Primary | ICD-10-CM

## 2023-03-16 DIAGNOSIS — F17.210 CIGARETTE SMOKER: ICD-10-CM

## 2023-03-16 DIAGNOSIS — E78.2 MIXED HYPERLIPIDEMIA: ICD-10-CM

## 2023-03-16 DIAGNOSIS — Z79.899 ENCOUNTER FOR LONG-TERM CURRENT USE OF MEDICATION: ICD-10-CM

## 2023-03-16 PROBLEM — Z12.11 ENCOUNTER FOR SCREENING COLONOSCOPY: Status: RESOLVED | Noted: 2019-07-24 | Resolved: 2023-03-16

## 2023-03-16 PROBLEM — E66.812 CLASS 2 SEVERE OBESITY DUE TO EXCESS CALORIES WITH SERIOUS COMORBIDITY AND BODY MASS INDEX (BMI) OF 36.0 TO 36.9 IN ADULT (HCC): Status: RESOLVED | Noted: 2021-09-17 | Resolved: 2023-03-16

## 2023-03-16 PROBLEM — S72.001A FRACTURE OF RIGHT HIP (HCC): Status: RESOLVED | Noted: 2021-09-17 | Resolved: 2023-03-16

## 2023-03-16 PROBLEM — E66.812 CLASS 2 SEVERE OBESITY DUE TO EXCESS CALORIES WITH SERIOUS COMORBIDITY AND BODY MASS INDEX (BMI) OF 35.0 TO 35.9 IN ADULT (HCC): Status: ACTIVE | Noted: 2023-03-16

## 2023-03-16 PROCEDURE — 3079F DIAST BP 80-89 MM HG: CPT | Performed by: FAMILY MEDICINE

## 2023-03-16 PROCEDURE — 90677 PCV20 VACCINE IM: CPT | Performed by: FAMILY MEDICINE

## 2023-03-16 PROCEDURE — 3075F SYST BP GE 130 - 139MM HG: CPT | Performed by: FAMILY MEDICINE

## 2023-03-16 PROCEDURE — 99396 PREV VISIT EST AGE 40-64: CPT | Performed by: FAMILY MEDICINE

## 2023-03-16 PROCEDURE — 90471 IMMUNIZATION ADMIN: CPT | Performed by: FAMILY MEDICINE

## 2023-03-16 PROCEDURE — 99214 OFFICE O/P EST MOD 30 MIN: CPT | Performed by: FAMILY MEDICINE

## 2023-03-16 PROCEDURE — 87624 HPV HI-RISK TYP POOLED RSLT: CPT | Performed by: FAMILY MEDICINE

## 2023-03-16 PROCEDURE — 3008F BODY MASS INDEX DOCD: CPT | Performed by: FAMILY MEDICINE

## 2023-03-16 RX ORDER — LISINOPRIL AND HYDROCHLOROTHIAZIDE 20; 12.5 MG/1; MG/1
1 TABLET ORAL EVERY MORNING
Qty: 90 TABLET | Refills: 1 | Status: SHIPPED | OUTPATIENT
Start: 2023-03-16

## 2023-03-17 LAB — HPV I/H RISK 1 DNA SPEC QL NAA+PROBE: NEGATIVE

## 2023-04-05 ENCOUNTER — TELEPHONE (OUTPATIENT)
Dept: FAMILY MEDICINE CLINIC | Facility: CLINIC | Age: 58
End: 2023-04-05

## 2023-04-05 NOTE — TELEPHONE ENCOUNTER
----- Message from Lindsey Malone DO sent at 4/4/2023  5:27 PM CDT -----  Please call patient:  Pap and high-risk HPV negative/normal.  However, shift in luanne suggestive of bacterial vaginosis, please inform patient this is an overgrowth of bacteria and not a sexually transmitted infection. Please asked patient if she is having any symptoms consistent with BV, if so would recommend prescribing metronidazole 500 mg 1 p.o. twice daily for 7 days with strict instructions do not drink any alcohol when taking this medication and for 72 hours after the last dose.

## 2023-07-27 DIAGNOSIS — I10 ESSENTIAL HYPERTENSION: ICD-10-CM

## 2023-07-27 DIAGNOSIS — Z79.899 ENCOUNTER FOR LONG-TERM CURRENT USE OF MEDICATION: ICD-10-CM

## 2023-07-28 RX ORDER — LISINOPRIL AND HYDROCHLOROTHIAZIDE 20; 12.5 MG/1; MG/1
1 TABLET ORAL EVERY MORNING
Qty: 90 TABLET | Refills: 1 | OUTPATIENT
Start: 2023-07-28

## 2023-07-28 NOTE — TELEPHONE ENCOUNTER
Requested Prescriptions     Refused Prescriptions Disp Refills    LISINOPRIL-HYDROCHLOROTHIAZIDE 20-12.5 MG Oral Tab [Pharmacy Med Name: LISINOPRIL-HCTZ 20/12.5MG TABLETS] 90 tablet 1     Sig: TAKE 1 TABLET BY MOUTH EVERY MORNING     Refused By: Kirstin Lindsay     Reason for Refusal: Patient has requested refill too soon     Last refill 3/16/23 for 6 months

## 2023-12-03 DIAGNOSIS — I10 ESSENTIAL HYPERTENSION: ICD-10-CM

## 2023-12-03 DIAGNOSIS — Z79.899 ENCOUNTER FOR LONG-TERM CURRENT USE OF MEDICATION: ICD-10-CM

## 2023-12-04 RX ORDER — LISINOPRIL AND HYDROCHLOROTHIAZIDE 20; 12.5 MG/1; MG/1
1 TABLET ORAL EVERY MORNING
Qty: 90 TABLET | Refills: 1 | OUTPATIENT
Start: 2023-12-04

## 2024-05-02 DIAGNOSIS — I10 ESSENTIAL HYPERTENSION: ICD-10-CM

## 2024-05-02 DIAGNOSIS — Z79.899 ENCOUNTER FOR LONG-TERM CURRENT USE OF MEDICATION: ICD-10-CM

## 2024-05-02 RX ORDER — LISINOPRIL AND HYDROCHLOROTHIAZIDE 20; 12.5 MG/1; MG/1
1 TABLET ORAL EVERY MORNING
Qty: 15 TABLET | Refills: 0 | OUTPATIENT
Start: 2024-05-02

## 2024-05-02 NOTE — TELEPHONE ENCOUNTER
Patient hasn't been seen in over a year. Last refill was 3/16/2023 for 6 months so patient has not been on this medication compliantly. Needs appointment.    Requested Prescriptions     Refused Prescriptions Disp Refills    LISINOPRIL-HYDROCHLOROTHIAZIDE 20-12.5 MG Oral Tab [Pharmacy Med Name: LISINOPRIL-HCTZ 20/12.5MG TABLETS] 15 tablet 0     Sig: TAKE 1 TABLET BY MOUTH EVERY MORNING     Refused By: SILVANO VALERA     Reason for Refusal: Appt required, please call patient

## 2024-06-03 ENCOUNTER — OFFICE VISIT (OUTPATIENT)
Dept: FAMILY MEDICINE CLINIC | Facility: CLINIC | Age: 59
End: 2024-06-03
Payer: COMMERCIAL

## 2024-06-03 VITALS
HEIGHT: 62.36 IN | RESPIRATION RATE: 22 BRPM | HEART RATE: 74 BPM | SYSTOLIC BLOOD PRESSURE: 120 MMHG | TEMPERATURE: 98 F | WEIGHT: 209 LBS | BODY MASS INDEX: 37.97 KG/M2 | DIASTOLIC BLOOD PRESSURE: 78 MMHG | OXYGEN SATURATION: 97 %

## 2024-06-03 DIAGNOSIS — F17.210 CIGARETTE SMOKER: ICD-10-CM

## 2024-06-03 DIAGNOSIS — K74.60 HEPATIC CIRRHOSIS, UNSPECIFIED HEPATIC CIRRHOSIS TYPE, UNSPECIFIED WHETHER ASCITES PRESENT (HCC): ICD-10-CM

## 2024-06-03 DIAGNOSIS — Z86.79 HISTORY OF HYPERTENSION: ICD-10-CM

## 2024-06-03 DIAGNOSIS — E66.01 CLASS 2 SEVERE OBESITY DUE TO EXCESS CALORIES WITH SERIOUS COMORBIDITY AND BODY MASS INDEX (BMI) OF 37.0 TO 37.9 IN ADULT (HCC): ICD-10-CM

## 2024-06-03 DIAGNOSIS — Z00.00 ROUTINE GENERAL MEDICAL EXAMINATION AT A HEALTH CARE FACILITY: Primary | ICD-10-CM

## 2024-06-03 DIAGNOSIS — I86.8 MESENTERIC VARICES: ICD-10-CM

## 2024-06-03 DIAGNOSIS — I86.8 SPLENIC VARICES: ICD-10-CM

## 2024-06-03 DIAGNOSIS — Z12.31 ENCOUNTER FOR SCREENING MAMMOGRAM FOR MALIGNANT NEOPLASM OF BREAST: ICD-10-CM

## 2024-06-03 DIAGNOSIS — H93.13 TINNITUS OF BOTH EARS: ICD-10-CM

## 2024-06-03 DIAGNOSIS — R16.1 SPLENOMEGALY: ICD-10-CM

## 2024-06-03 DIAGNOSIS — Z78.0 POSTMENOPAUSAL: ICD-10-CM

## 2024-06-03 DIAGNOSIS — Z71.6 ENCOUNTER FOR SMOKING CESSATION COUNSELING: ICD-10-CM

## 2024-06-03 DIAGNOSIS — K86.2 CYST OF PANCREAS (HCC): ICD-10-CM

## 2024-06-03 DIAGNOSIS — M85.89 OSTEOPENIA OF MULTIPLE SITES: ICD-10-CM

## 2024-06-03 PROCEDURE — 99396 PREV VISIT EST AGE 40-64: CPT | Performed by: FAMILY MEDICINE

## 2024-06-03 PROCEDURE — 99214 OFFICE O/P EST MOD 30 MIN: CPT | Performed by: FAMILY MEDICINE

## 2024-06-03 RX ORDER — ZINC SULFATE 50(220)MG
220 CAPSULE ORAL DAILY
COMMUNITY
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
## 2024-06-11 PROBLEM — K74.60 HEPATIC CIRRHOSIS (HCC): Status: ACTIVE | Noted: 2024-06-11

## 2024-06-11 PROBLEM — E66.812 CLASS 2 SEVERE OBESITY DUE TO EXCESS CALORIES WITH SERIOUS COMORBIDITY AND BODY MASS INDEX (BMI) OF 37.0 TO 37.9 IN ADULT (HCC): Status: ACTIVE | Noted: 2023-03-16

## 2024-06-11 PROBLEM — R16.1 SPLENOMEGALY: Status: ACTIVE | Noted: 2024-06-11

## 2024-06-11 PROBLEM — E66.01 CLASS 2 SEVERE OBESITY DUE TO EXCESS CALORIES WITH SERIOUS COMORBIDITY AND BODY MASS INDEX (BMI) OF 37.0 TO 37.9 IN ADULT (HCC): Status: ACTIVE | Noted: 2023-03-16

## 2024-06-11 PROBLEM — I86.8 SPLENIC VARICES: Status: ACTIVE | Noted: 2024-06-11

## 2024-06-11 PROBLEM — I86.8: Status: ACTIVE | Noted: 2024-06-11

## 2024-06-25 ENCOUNTER — HOSPITAL ENCOUNTER (OUTPATIENT)
Dept: CT IMAGING | Age: 59
Discharge: HOME | End: 2024-06-25
Attending: FAMILY MEDICINE

## 2024-06-25 DIAGNOSIS — K86.2 CYST OF PANCREAS (HCC): ICD-10-CM

## 2024-06-25 DIAGNOSIS — R16.1 SPLENOMEGALY: ICD-10-CM

## 2024-06-25 DIAGNOSIS — K74.60 HEPATIC CIRRHOSIS, UNSPECIFIED HEPATIC CIRRHOSIS TYPE, UNSPECIFIED WHETHER ASCITES PRESENT (HCC): ICD-10-CM

## 2024-06-25 DIAGNOSIS — I86.8 SPLENIC VARICES: ICD-10-CM

## 2024-06-25 PROCEDURE — 74170 CT ABD WO CNTRST FLWD CNTRST: CPT | Performed by: FAMILY MEDICINE

## 2024-07-02 ENCOUNTER — TELEPHONE (OUTPATIENT)
Dept: FAMILY MEDICINE CLINIC | Facility: CLINIC | Age: 59
End: 2024-07-02

## 2024-07-02 DIAGNOSIS — R19.00 INTRAABDOMINAL MASS: Primary | ICD-10-CM

## 2024-07-02 NOTE — TELEPHONE ENCOUNTER
First attempt of outgoing call made to patient, left message on voicemail for patient to call me back at her earliest convenience regarding test results and that the office phones will be on at 8:30 AM 7/3/2024.  (Need to discuss with patient findings of CT of abdomen with and without contrast dated 6/25/2024 that are concerning for neoplasm/malignancy.  I am referring patient to Dr. Grubbs.    Provider Address Phone   Michael Grubbs MD [address] [place] [id number] [phone]       Please call patient and let me know when you get a hold of her so that I can speak with her regarding the test results.

## 2024-07-05 NOTE — TELEPHONE ENCOUNTER
Spoke w/patient and gave contact information for Dr. Grubbs. Let her know Dr. Garner wanted to speak w/her. She will be waiting for her call. Dr. Garner notified.

## 2024-07-05 NOTE — TELEPHONE ENCOUNTER
Phone call made to Dr. Grubbs's office, Dr. Grubbs is out of the office next week, I spoke with his medical assistant, Belkis, patient will be added to Dr. Grubbs schedule for Monday, 7/15/2024, review will also contact the other surgical oncologist team to see if they have a sooner appointment, she will inform the patient of the above.

## 2024-07-05 NOTE — TELEPHONE ENCOUNTER
Outgoing call made to patient, patient informed of results of CT of abdomen with and without contrast dated 6/25/2024, results concerning for probable malignancy and possibly metastasis.  Patient advised to call to make appointment to see Dr. Grubbs as soon as possible, if unable to get an appointment for next week patient advised to call to let me know so we can facilitate an appointment to see Dr. Grubbs soon as possible.

## 2024-07-09 LAB
ABSOLUTE BASOPHILS: 37 CELLS/UL (ref 0–200)
ABSOLUTE EOSINOPHILS: 167 CELLS/UL (ref 15–500)
ABSOLUTE LYMPHOCYTES: 843 CELLS/UL (ref 850–3900)
ABSOLUTE MONOCYTES: 378 CELLS/UL (ref 200–950)
ABSOLUTE NEUTROPHILS: 4774 CELLS/UL (ref 1500–7800)
ALBUMIN/GLOBULIN RATIO: 1.2 (CALC) (ref 1–2.5)
ALBUMIN: 4 G/DL (ref 3.6–5.1)
ALKALINE PHOSPHATASE: 130 U/L (ref 37–153)
ALT: 15 U/L (ref 6–29)
AST: 30 U/L (ref 10–35)
BASOPHILS: 0.6 %
BILIRUBIN, TOTAL: 0.8 MG/DL (ref 0.2–1.2)
BUN: 17 MG/DL (ref 7–25)
CALCIUM: 8.8 MG/DL (ref 8.6–10.4)
CARBON DIOXIDE: 25 MMOL/L (ref 20–32)
CHLORIDE: 104 MMOL/L (ref 98–110)
CHOL/HDLC RATIO: 2.6 (CALC)
CHOLESTEROL, TOTAL: 170 MG/DL
CREATININE: 1.02 MG/DL (ref 0.5–1.03)
EGFR: 63 ML/MIN/1.73M2
EOSINOPHILS: 2.7 %
GLOBULIN: 3.3 G/DL (CALC) (ref 1.9–3.7)
GLUCOSE: 121 MG/DL (ref 65–99)
HDL CHOLESTEROL: 65 MG/DL
HEMATOCRIT: 37.7 % (ref 35–45)
HEMOGLOBIN: 10.9 G/DL (ref 11.7–15.5)
LDL-CHOLESTEROL: 83 MG/DL (CALC)
LYMPHOCYTES: 13.6 %
MCH: 23.4 PG (ref 27–33)
MCHC: 28.9 G/DL (ref 32–36)
MCV: 81.1 FL (ref 80–100)
MONOCYTES: 6.1 %
MPV: 10.3 FL (ref 7.5–12.5)
NEUTROPHILS: 77 %
NON-HDL CHOLESTEROL: 105 MG/DL (CALC)
PLATELET COUNT: 119 THOUSAND/UL (ref 140–400)
POTASSIUM: 4 MMOL/L (ref 3.5–5.3)
PROTEIN, TOTAL: 7.3 G/DL (ref 6.1–8.1)
RDW: 14.8 % (ref 11–15)
RED BLOOD CELL COUNT: 4.65 MILLION/UL (ref 3.8–5.1)
SODIUM: 139 MMOL/L (ref 135–146)
T4, FREE: 0.9 NG/DL (ref 0.8–1.8)
TRIGLYCERIDES: 120 MG/DL
TSH: 4.52 MIU/L (ref 0.4–4.5)
WHITE BLOOD CELL COUNT: 6.2 THOUSAND/UL (ref 3.8–10.8)

## 2024-07-12 ENCOUNTER — OFFICE VISIT (OUTPATIENT)
Dept: SURGERY | Facility: CLINIC | Age: 59
End: 2024-07-12
Payer: COMMERCIAL

## 2024-07-12 VITALS
OXYGEN SATURATION: 98 % | TEMPERATURE: 98 F | SYSTOLIC BLOOD PRESSURE: 109 MMHG | RESPIRATION RATE: 20 BRPM | BODY MASS INDEX: 39 KG/M2 | DIASTOLIC BLOOD PRESSURE: 75 MMHG | WEIGHT: 214.38 LBS | HEART RATE: 88 BPM

## 2024-07-12 DIAGNOSIS — K86.9 LESION OF PANCREAS (HCC): Primary | ICD-10-CM

## 2024-07-12 LAB
CANCER AG19-9 SERPL-ACNC: 11.5 U/ML (ref ?–37)
CEA SERPL-MCNC: 4.5 NG/ML (ref ?–5)

## 2024-07-12 PROCEDURE — 86301 IMMUNOASSAY TUMOR CA 19-9: CPT | Performed by: PHYSICIAN ASSISTANT

## 2024-07-12 PROCEDURE — 82378 CARCINOEMBRYONIC ANTIGEN: CPT | Performed by: PHYSICIAN ASSISTANT

## 2024-07-12 PROCEDURE — 99205 OFFICE O/P NEW HI 60 MIN: CPT | Performed by: SURGERY

## 2024-07-12 RX ORDER — NALTREXONE HYDROCHLORIDE 50 MG/1
50 TABLET, FILM COATED ORAL DAILY
COMMUNITY
Start: 2024-06-30

## 2024-07-14 DIAGNOSIS — R94.6 ABNORMAL THYROID FUNCTION TEST: ICD-10-CM

## 2024-07-14 DIAGNOSIS — R73.01 IMPAIRED FASTING GLUCOSE: Primary | ICD-10-CM

## 2024-07-15 ENCOUNTER — TELEPHONE (OUTPATIENT)
Dept: FAMILY MEDICINE CLINIC | Facility: CLINIC | Age: 59
End: 2024-07-15

## 2024-07-15 NOTE — TELEPHONE ENCOUNTER
Recommend patient be seen sooner by Dr. Tinajero.  Please call Dr. Tinajero's office to facilitate an appointment as soon as possible, patient likely with intra-abdominal malignancy/cancer.        6/25/2024 CT abdomen with and without contrast:      Impression   CONCLUSION:    1. Infiltrative lesion in left upper quadrant extends from the pancreatic tail to the posterior wall of the stomach, superior pole of the left kidney, splenic hilum and lateral abdominal wall.  There appears to be tethering of the posterior wall of the  stomach.  This corresponds to the location of the previously demonstrated pancreatic cyst which is no longer identified.  The appearance is suspicious for neoplasm, inflammatory lesion cannot be entirely excluded.  2. Development of mild retroperitoneal lymphadenopathy and left adrenal thickening concerning for metastatic involvement.  3. Cirrhotic liver.  4. Development of splenomegaly.  Occlusion of the splenic vein was present previously and multiple perisplenic, perigastric and mesenteric varices are again seen.  5. L3 compression fracture was not present on 8/4/2020 and is of indeterminate age.  MRI could be beneficial in further evaluation.

## 2024-07-15 NOTE — TELEPHONE ENCOUNTER
Patient calling to inform/ask Dr. Kymberly Garner if patient should wait until 8/23/24 to see Dr. Sigifredo Tinajero.     Patient was referred to see Dr. Edson Lo referred to Dr. Sigifredo Tinajero for patient to have biopsy first. Then Dr. Edson Lo would be able to see patient.

## 2024-07-15 NOTE — TELEPHONE ENCOUNTER
Received call from Wendy clayton/ . We did change her appointment to August 16th at 11:40. This is the soonest appointment they  have available as he is out of the office a couple different times over the next month on vacation. I called patient and gave her the appointment details and told her to follow up with 's office if she had a schedule conflict.

## 2024-07-17 ENCOUNTER — OFFICE VISIT (OUTPATIENT)
Facility: LOCATION | Age: 59
End: 2024-07-17
Payer: COMMERCIAL

## 2024-07-17 DIAGNOSIS — H93.13 TINNITUS OF BOTH EARS: Primary | ICD-10-CM

## 2024-07-17 DIAGNOSIS — H90.3 SENSORINEURAL HEARING LOSS (SNHL) OF BOTH EARS: ICD-10-CM

## 2024-07-17 DIAGNOSIS — H93.293 ABNORMAL AUDITORY PERCEPTION OF BOTH EARS: ICD-10-CM

## 2024-07-17 DIAGNOSIS — R42 VERTIGO: ICD-10-CM

## 2024-07-17 PROCEDURE — 92567 TYMPANOMETRY: CPT | Performed by: AUDIOLOGIST

## 2024-07-17 PROCEDURE — 99203 OFFICE O/P NEW LOW 30 MIN: CPT | Performed by: OTOLARYNGOLOGY

## 2024-07-17 PROCEDURE — 92557 COMPREHENSIVE HEARING TEST: CPT | Performed by: AUDIOLOGIST
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
## 2024-08-01 ENCOUNTER — HOSPITAL ENCOUNTER (OUTPATIENT)
Facility: HOSPITAL | Age: 59
Setting detail: HOSPITAL OUTPATIENT SURGERY
Discharge: HOME | End: 2024-08-01
Attending: INTERNAL MEDICINE | Admitting: INTERNAL MEDICINE
Payer: COMMERCIAL

## 2024-08-01 ENCOUNTER — ANESTHESIA EVENT (OUTPATIENT)
Dept: ENDOSCOPY | Facility: HOSPITAL | Age: 59
End: 2024-08-01
Payer: COMMERCIAL

## 2024-08-01 ENCOUNTER — ANESTHESIA (OUTPATIENT)
Dept: ENDOSCOPY | Facility: HOSPITAL | Age: 59
End: 2024-08-01
Payer: COMMERCIAL

## 2024-08-01 VITALS
RESPIRATION RATE: 16 BRPM | SYSTOLIC BLOOD PRESSURE: 156 MMHG | WEIGHT: 210 LBS | HEART RATE: 73 BPM | HEIGHT: 63 IN | BODY MASS INDEX: 37.21 KG/M2 | TEMPERATURE: 97 F | OXYGEN SATURATION: 99 % | DIASTOLIC BLOOD PRESSURE: 96 MMHG

## 2024-08-01 PROCEDURE — 0DJ08ZZ INSPECTION OF UPPER INTESTINAL TRACT, VIA NATURAL OR ARTIFICIAL OPENING ENDOSCOPIC: ICD-10-PCS | Performed by: INTERNAL MEDICINE

## 2024-08-01 PROCEDURE — BF45ZZZ ULTRASONOGRAPHY OF LIVER: ICD-10-PCS | Performed by: INTERNAL MEDICINE

## 2024-08-01 RX ORDER — SODIUM CHLORIDE, SODIUM LACTATE, POTASSIUM CHLORIDE, CALCIUM CHLORIDE 600; 310; 30; 20 MG/100ML; MG/100ML; MG/100ML; MG/100ML
INJECTION, SOLUTION INTRAVENOUS CONTINUOUS
Status: DISCONTINUED | OUTPATIENT
Start: 2024-08-01 | End: 2024-08-01

## 2024-08-01 RX ORDER — ONDANSETRON 2 MG/ML
4 INJECTION INTRAMUSCULAR; INTRAVENOUS EVERY 6 HOURS PRN
Status: DISCONTINUED | OUTPATIENT
Start: 2024-08-01 | End: 2024-08-01

## 2024-08-01 RX ORDER — HYDROMORPHONE HYDROCHLORIDE 1 MG/ML
0.2 INJECTION, SOLUTION INTRAMUSCULAR; INTRAVENOUS; SUBCUTANEOUS EVERY 5 MIN PRN
Status: DISCONTINUED | OUTPATIENT
Start: 2024-08-01 | End: 2024-08-01

## 2024-08-01 RX ORDER — LIDOCAINE HYDROCHLORIDE 10 MG/ML
INJECTION, SOLUTION EPIDURAL; INFILTRATION; INTRACAUDAL; PERINEURAL AS NEEDED
Status: DISCONTINUED | OUTPATIENT
Start: 2024-08-01 | End: 2024-08-01 | Stop reason: SURG

## 2024-08-01 RX ORDER — NALOXONE HYDROCHLORIDE 0.4 MG/ML
0.08 INJECTION, SOLUTION INTRAMUSCULAR; INTRAVENOUS; SUBCUTANEOUS AS NEEDED
Status: DISCONTINUED | OUTPATIENT
Start: 2024-08-01 | End: 2024-08-01

## 2024-08-01 RX ORDER — PROCHLORPERAZINE EDISYLATE 5 MG/ML
5 INJECTION INTRAMUSCULAR; INTRAVENOUS EVERY 8 HOURS PRN
Status: DISCONTINUED | OUTPATIENT
Start: 2024-08-01 | End: 2024-08-01

## 2024-08-01 RX ORDER — HYDROMORPHONE HYDROCHLORIDE 1 MG/ML
0.4 INJECTION, SOLUTION INTRAMUSCULAR; INTRAVENOUS; SUBCUTANEOUS EVERY 5 MIN PRN
Status: DISCONTINUED | OUTPATIENT
Start: 2024-08-01 | End: 2024-08-01

## 2024-08-01 RX ORDER — HYDROMORPHONE HYDROCHLORIDE 1 MG/ML
0.6 INJECTION, SOLUTION INTRAMUSCULAR; INTRAVENOUS; SUBCUTANEOUS EVERY 5 MIN PRN
Status: DISCONTINUED | OUTPATIENT
Start: 2024-08-01 | End: 2024-08-01

## 2024-08-01 RX ADMIN — LIDOCAINE HYDROCHLORIDE 25 MG: 10 INJECTION, SOLUTION EPIDURAL; INFILTRATION; INTRACAUDAL; PERINEURAL at 16:13:00

## 2024-08-01 RX ADMIN — SODIUM CHLORIDE, SODIUM LACTATE, POTASSIUM CHLORIDE, CALCIUM CHLORIDE: 600; 310; 30; 20 INJECTION, SOLUTION INTRAVENOUS at 16:34:00

## 2024-08-01 NOTE — ANESTHESIA POSTPROCEDURE EVALUATION
[facility]    Allison Maynard Patient Status:  Hospital Outpatient Surgery   Age/Gender 59 year old female MRN [id number]   Location [facility] Attending Sigifredo Tinajero MD   Hosp Day # 0 PCP Kymberly Garner DO       Anesthesia Post-op Note    UPPER ENDOSCOPIC ULTRASOUND (EUS), ESOPHAGOGASTRODUOENOSCOPY    Procedure Summary       Date: 08/01/24 Room / Location:  ENDOSCOPY 02 / [facility]    Anesthesia Start: 1612 Anesthesia Stop: 1634    Procedure: UPPER ENDOSCOPIC ULTRASOUND (EUS), ESOPHAGOGASTRODUOENOSCOPY Diagnosis:       Pancreatic lesion (HCC)      (Hiatal hernia, gastric varices, liver cirrhosis)    Surgeons: Sigifredo Tinajero MD Anesthesiologist: Chris Betancourt MD    Anesthesia Type: MAC ASA Status: 3            Anesthesia Type: MAC    Vitals Value Taken Time   /73 08/01/24 1634   Temp 97.3 °F (36.3 °C) 08/01/24 1634   Pulse 85 08/01/24 1634   Resp 16 08/01/24 1634   SpO2 95 % 08/01/24 1634       Patient Location: Endoscopy    Anesthesia Type: MAC    Airway Patency: patent    Postop Pain Control: adequate    Mental Status: mildly sedated but able to meaningfully participate in the post-anesthesia evaluation    Nausea/Vomiting: none    Cardiopulmonary/Hydration status: stable euvolemic    Complications: no apparent anesthesia related complications    Postop vital signs: stable    Dental Exam: Unchanged from Preop    Patient to be discharged home when criteria met.

## 2024-08-01 NOTE — ANESTHESIA PREPROCEDURE EVALUATION
PRE-OP EVALUATION    Patient Name: Allison Maynard    Admit Diagnosis: Pancreatic lesion (HCC) [K86.9]    Pre-op Diagnosis: Pancreatic lesion (HCC) [K86.9]    UPPER ENDOSCOPIC ULTRASOUND (EUS)    Anesthesia Procedure: UPPER ENDOSCOPIC ULTRASOUND (EUS)    Surgeons and Role:     * Sigifredo Tinajero MD - Primary    Pre-op vitals reviewed.        Body mass index is 37.2 kg/m².    Current medications reviewed.  Hospital Medications:  No current facility-administered medications on file as of 8/1/2024.       Outpatient Medications:     Medications Prior to Admission   Medication Sig Dispense Refill Last Dose    naltrexone 50 MG Oral Tab Take 1 tablet (50 mg total) by mouth daily.       zinc sulfate 220 (50 Zn) MG Oral Cap Take 1 capsule (220 mg total) by mouth daily.       cyanocobalamine 100 MCG Oral Tab Take 1 tablet (100 mcg total) by mouth daily.       buPROPion  MG Oral Tablet 24 Hr Take 50 mg by mouth daily.       busPIRone 10 MG Oral Tab Take 1 tablet (10 mg total) by mouth 3 (three) times daily as needed.       escitalopram 20 MG Oral Tab Take 1 tablet (20 mg total) by mouth daily.       ziprasidone 40 MG Oral Cap Take 1 capsule (40 mg total) by mouth daily.       Esomeprazole Magnesium 20 MG Oral Tab EC Take 1 tablet by mouth daily.       OMEGA-3 KRILL OIL OR Take 350 mg by mouth daily.         Bioflavonoid Products (YARIEL-C) Oral Tab Take 1,000 mg by mouth daily.       Cholecalciferol (VITAMIN D3) 1000 units Oral Cap Take 1 tablet by mouth daily.          Allergies: Aspirin      Anesthesia Evaluation    Patient summary reviewed.    Anesthetic Complications           GI/Hepatic/Renal                (+) liver disease                 Cardiovascular                (+) obesity  (+) hypertension                                     Endo/Other                                  Pulmonary                           Neuro/Psych                                      Past Surgical History:   Procedure Laterality Date     Bone density test scan Bilateral 02/11/2020    Colonoscopy N/A 08/22/2019    Procedure: COLONOSCOPY, POSSIBLE BIOPSY, POSSIBLE POLYPECTOMY 24730;  Surgeon: Glenn Ross MD;  Location: [facility]    Colonoscopy      Hip surgery  05/15/2019    Right hip facture with ORIFDr. Giordano     Total hip replacement Right      Social History     Socioeconomic History    Marital status:    Tobacco Use    Smoking status: Every Day     Current packs/day: 1.00     Average packs/day: 1 pack/day for 46.5 years (46.5 ttl pk-yrs)     Types: Cigarettes     Start date: 1/18/1978    Smokeless tobacco: Never    Tobacco comments:     50-80-pack-year history of smoking.   Vaping Use    Vaping status: Never Used   Substance and Sexual Activity    Alcohol use: Not Currently    Drug use: Yes     Types: Cannabis     Comment: gummies   Other Topics Concern    Caffeine Concern No    Exercise No    Seat Belt Yes     History   Drug Use    Types: Cannabis     Comment: gummies     Available pre-op labs reviewed.  Lab Results   Component Value Date    WBC 6.2 07/08/2024    RBC 4.65 07/08/2024    HGB 10.9 (L) 07/08/2024    HCT 37.7 07/08/2024    MCV 81.1 07/08/2024    MCH 23.4 (L) 07/08/2024    MCHC 28.9 (L) 07/08/2024    RDW 14.8 07/08/2024     (L) 07/08/2024     Lab Results   Component Value Date     07/08/2024    K 4.0 07/08/2024     07/08/2024    CO2 25 07/08/2024    BUN 17 07/08/2024    CREATSERUM 1.02 07/08/2024     (H) 07/08/2024    CA 8.8 07/08/2024            Airway      Mallampati: III  Mouth opening: <3 FB  TM distance: < 4 cm  Neck ROM: limited Cardiovascular    Cardiovascular exam normal.  Rhythm: regular  Rate: normal     Dental             Pulmonary    Pulmonary exam normal.                 Other findings              ASA: 3   Plan: MAC  NPO status verified and patient meets guidelines.          Plan/risks discussed with: patient and significant other                Present on  Admission:  **None**

## 2024-08-01 NOTE — OPERATIVE REPORT
Allison Maynard Patient Status:  Hospital Outpatient Surgery    1965 MRN [id number]   [facility] END[facility]COPY [facility] Attending Sigifredo Tinajero MD   Date 2024 PCP Kymberly Garner DO     PREOPERATIVE DIAGNOSIS/INDICATION: Cirrhosis, h/o pancreas tail cyst, abnormal CT: PANCREAS:  Previously demonstrated large pancreatic tail cyst is no longer present.  There is hypoenhancing tissue extending from the pancreatic tail to the posterior gastric wall , upper pole of the left kidney and splenic hilum and continuing inferior   to the spleen to the lateral abdominal wall with thickening of the associated internal oblique muscle.  The mass is poorly marginated, with measurements of 4.4 x 2.4 cm adjacent to the stomach on image 3 of series 31, 3.0 x 2.3 cm between the pancreatic tail and splenic hilum, and 5.7 x 1.9 cm inferiorly.   POSTOPERTATIVE DIAGNOSIS: Liver cirrhosis, chronic pancreatitis, gastric varices, splenic varices, non specific infiltrative process at splenic hilum  PROCEDURE PERFORMED: EUS/EGD  TIME OUT WAS PERFORMED    SEDATION: MAC sedation provided by General Anesthesia    INFORMED CONSENT: Risks, benefits and alternatives to the procedure were explained to the patient including but not limited to bleeding, infection, perforation, adverse drug reactions, pancreatitis and the need for hospitalization and surgery if this occurs, the patient understands and agrees to procedure.  PROCEDURE DESCRIPTION: The upper endoscope and later the therapeutic side viewing echoendoscope were introduced into the patient’s mouth, hypo pharynx, esophagus, stomach and the first and second portion of the duodenum, straightening of the endoscope was performed to obtain a direct view of the major ampulla, retroflexion was performed in the stomach with the EGD scope only. Careful examination of the above described areas was performed on withdrawal of the endoscope. The patient tolerated the procedure  well and there were no immediate complications noted during the procedure, the patient was transported to the recovery area in stable condition.  FINDINGS:  ESOPHAGUS: Trace varices  GEJ: 3 cm Hiatal hernia  STOMACH: Gastric fundic varices  DUODENUM: Normal  ECHO: Imaging was performed through the esophagus, stomach and duodenum. There were splenic hilum varices and perigastric varices, the visualized portions of the left hepatic lobe showed coarse hepatic echotexture, nodularity, the visualized pancreatic parenchyma showed atrophy, limited visualization of the body and tail, hyperechoic stranding, honeycombing, the biliary tree, the portal vein confluence showed splenic vein collaterals, non specific infiltrative process near and around splenic hilum  THERAPEUTICS: None  RECOMMENDATIONS:   Post EUS/EGD precautions, watch for bleeding, infection, perforation, adverse drug reactions and pancreatitis.  Call status report post procedure.  CT atte. Pancreas and CA 19-9 in 3 months    Sigifredo Tinajero MD  8/1/2024  4:33 PM
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
## 2024-08-01 NOTE — DISCHARGE INSTRUCTIONS
Home Care Instructions for Colonoscopy and/or Gastroscopy with Sedation    Diet:  - Resume your regular diet as tolerated unless otherwise instructed.  - Start with light meals to minimize bloating.  - Do not drink alcohol today.    Medication:  - If you have questions about resuming your normal medications, please contact your Primary Care Physician.    Activities:  - Take it easy today. Do not return to work today.  - Do not drive today.  - Do not operate any machinery today (including kitchen equipment).  Gastroscopy:  - You may have a sore throat for 2-3 days following the exam. This is normal. Gargling with warm salt water (1/2 tsp salt to 1 glass warm water) or using throat lozenges will help.  - If you experience any sharp pain in your neck, abdomen or chest, vomiting of blood, oral temperature over 100 degrees Fahrenheit, light-headedness or dizziness, or any other problems, contact your doctor.    **If unable to reach your doctor, please go to the [facility]**    - Your referring physician will receive a full report of your examination.  - If you do not hear from your doctor's office within two weeks of your biopsy, please call them for your results.    You may be able to see your laboratory results in [employer] between 4 and 7 business days.  In some cases, your physician may not have viewed the results before they are released to [employer].  If you have questions regarding your results contact the physician who ordered the test/exam by phone or via [employer] by choosing \"Ask a Medical Question.\"

## 2024-10-24 ENCOUNTER — TELEPHONE (OUTPATIENT)
Dept: FAMILY MEDICINE CLINIC | Facility: CLINIC | Age: 59
End: 2024-10-24

## 2024-10-24 NOTE — TELEPHONE ENCOUNTER
Allison Maynard was scheduled for an appt on 10/24/2024 with a visit reason of Follow up.    Letter/MyChart message sent to pt notifying of missed appointment with $40 no show fee.

## 2024-11-01 LAB
HEMOGLOBIN A1C: 5.3 % OF TOTAL HGB
THYROGLOBULIN ANTIBODIES: <1 IU/ML
THYROID PEROXIDASE$ANTIBODIES: <1 IU/ML

## 2024-12-26 ENCOUNTER — TELEPHONE (OUTPATIENT)
Dept: SURGERY | Facility: CLINIC | Age: 59
End: 2024-12-26

## 2024-12-26 NOTE — TELEPHONE ENCOUNTER
Called patient regarding follow-up and overdue from November 2024 CT pancreas and CA 19-9 by Dr. Tinajero. Dr. Tinajero's office has been unable to reach patient. Patient is scheduled with Dr. Romero on 1/13/25 for hepatology consult. M, awaiting call back.

## 2025-03-06 ENCOUNTER — TELEPHONE (OUTPATIENT)
Dept: SURGERY | Facility: CLINIC | Age: 60
End: 2025-03-06

## 2025-03-06 NOTE — TELEPHONE ENCOUNTER
Unable to reach patient. Patient has outstanding imaging/lab orders to complete before follow-up appt with Dr. Lo. Letter generated and sent via [employer].

## (undated) DEVICE — 1200CC GUARDIAN II: Brand: GUARDIAN

## (undated) DEVICE — 3M™ RED DOT™ MONITORING ELECTRODE WITH FOAM TAPE AND STICKY GEL, 50/BAG, 20/CASE, 72/PLT 2570: Brand: RED DOT™

## (undated) DEVICE — BALLOON HEMOSTATIC EUS LINEAR

## (undated) DEVICE — KIT CUSTOM ENDOPROCEDURE STERIS

## (undated) DEVICE — Device

## (undated) DEVICE — 10FT COMBINED O2 DELIVERY/CO2 MONITORING. FILTER WITH MICROSTREAM TYPE LUER: Brand: DUAL ADULT NASAL CANNULA

## (undated) DEVICE — FILTERLINE NASAL ADULT O2/CO2

## (undated) DEVICE — ENDOSCOPY PACK UPPER: Brand: MEDLINE INDUSTRIES, INC.

## (undated) DEVICE — KIT MFLD FOR SPEC COLL

## (undated) NOTE — LETTER
19    Patient: Trina Lake  : 1965 Visit date: 2019    Dear  Dr. Maria Esther Chahal, DO,    Thank you for referring Trina Aleksandra to my practice. Please find my assessment and plan below.          Assessment   Encounter for screening colonoscop At today's office visit I discussed with the patient she is due for colonoscopy. This will be scheduled at the patient's convenience. The prep and the procedure were discussed at today's office visit.     Additionally, I discussed with the patient that du

## (undated) NOTE — LETTER
07/15/19        [facility]  [address] [address]      Dear Ange Díaz,    [address] [place] [address] records indicate that you have outstanding lab work and or testing that was ordered for you and has not yet been completed:           XR DEXA BONE DENSITOME

## (undated) NOTE — LETTER
19    Patient: Oralia Horta  : 1965 Visit date: 2019    Dear  Dr. Samual Pallas, DO,    Thank you for referring Annerowena Rula to my practice. Please find my assessment and plan below.              Assessment   Encounter for screening colono The patient is awake, alert, no acute distress. Her lungs are clear to auscultation bilaterally. Her heart rate and rhythm are regular. Her abdomen is soft, nontender, nondistended, with normoactive bowel sounds present.   There is no rebound or guarding

## (undated) NOTE — LETTER
10/24/2024    Allison Maynard  [address] [place] [address]    Dear Allison,    We would like to inform you that your account has been charged $40 for not showing up to the office for your scheduled appointment on 10/24/2024.    Our no-show policy is as follows: A 24-hour notice is required, or you may be charged a $40 No Show fee.      If you are unable to keep your scheduled appointment, please notify us at least 24 hours in advance so we can accommodate our other patients. You may also reschedule your appointment at that time.    On the third no-show, within a 12-month period, it will be the physician’s discretion as to whether a discharge letter will be sent out disengaging you from the practice and giving you 30 days to enroll with a new non [facility] physician.    If you would like to contest this charge, please call [phone].    Sincerely,  [facility]

## (undated) NOTE — LETTER
08/23/19        [facility]  [address] [address]      Dear Mitch Eaton,    [address] [place] [address] records indicate that you have outstanding lab work and or testing that was ordered for you and has not yet been completed:        FERRITIN [[id number]] [Q]      I